# Patient Record
Sex: FEMALE | Race: ASIAN | NOT HISPANIC OR LATINO | URBAN - METROPOLITAN AREA
[De-identification: names, ages, dates, MRNs, and addresses within clinical notes are randomized per-mention and may not be internally consistent; named-entity substitution may affect disease eponyms.]

---
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
## 2024-05-07 ENCOUNTER — EMERGENCY (EMERGENCY)
Facility: HOSPITAL | Age: 19
LOS: 1 days | Discharge: STILL ADMITTED OR ADMINISTRATIVE | End: 2024-05-07
Admitting: EMERGENCY MEDICINE
Payer: MEDICAID

## 2024-05-07 ENCOUNTER — APPOINTMENT (OUTPATIENT)
Dept: ANTEPARTUM | Facility: CLINIC | Age: 19
End: 2024-05-07

## 2024-05-07 PROCEDURE — L9996: CPT

## 2024-05-08 ENCOUNTER — TRANSCRIPTION ENCOUNTER (OUTPATIENT)
Age: 19
End: 2024-05-08

## 2024-05-08 ENCOUNTER — INPATIENT (INPATIENT)
Facility: HOSPITAL | Age: 19
LOS: 1 days | Discharge: HOME | End: 2024-05-10
Attending: SPECIALIST | Admitting: SPECIALIST
Payer: MEDICAID

## 2024-05-08 VITALS
HEART RATE: 88 BPM | RESPIRATION RATE: 16 BRPM | TEMPERATURE: 98 F | SYSTOLIC BLOOD PRESSURE: 136 MMHG | DIASTOLIC BLOOD PRESSURE: 88 MMHG

## 2024-05-08 VITALS
SYSTOLIC BLOOD PRESSURE: 129 MMHG | DIASTOLIC BLOOD PRESSURE: 87 MMHG | TEMPERATURE: 98 F | RESPIRATION RATE: 16 BRPM | HEART RATE: 92 BPM | OXYGEN SATURATION: 96 %

## 2024-05-08 DIAGNOSIS — O26.899 OTHER SPECIFIED PREGNANCY RELATED CONDITIONS, UNSPECIFIED TRIMESTER: ICD-10-CM

## 2024-05-08 DIAGNOSIS — O42.90 PREMATURE RUPTURE OF MEMBRANES, UNSPECIFIED AS TO LENGTH OF TIME BETWEEN RUPTURE AND ONSET OF LABOR, UNSPECIFIED WEEKS OF GESTATION: ICD-10-CM

## 2024-05-08 LAB
BASOPHILS # BLD AUTO: 0.04 K/UL — SIGNIFICANT CHANGE UP (ref 0–0.2)
BASOPHILS NFR BLD AUTO: 0.4 % — SIGNIFICANT CHANGE UP (ref 0–2)
BLD GP AB SCN SERPL QL: NEGATIVE — SIGNIFICANT CHANGE UP
EOSINOPHIL # BLD AUTO: 0.1 K/UL — SIGNIFICANT CHANGE UP (ref 0–0.5)
EOSINOPHIL NFR BLD AUTO: 1.1 % — SIGNIFICANT CHANGE UP (ref 0–6)
GLUCOSE BLDC GLUCOMTR-MCNC: 111 MG/DL — HIGH (ref 70–99)
GLUCOSE BLDC GLUCOMTR-MCNC: 118 MG/DL — HIGH (ref 70–99)
GLUCOSE BLDC GLUCOMTR-MCNC: 120 MG/DL — HIGH (ref 70–99)
GLUCOSE BLDC GLUCOMTR-MCNC: 129 MG/DL — HIGH (ref 70–99)
GLUCOSE BLDC GLUCOMTR-MCNC: 132 MG/DL — HIGH (ref 70–99)
GLUCOSE BLDC GLUCOMTR-MCNC: 133 MG/DL — HIGH (ref 70–99)
GLUCOSE BLDC GLUCOMTR-MCNC: 136 MG/DL — HIGH (ref 70–99)
GLUCOSE BLDC GLUCOMTR-MCNC: 141 MG/DL — HIGH (ref 70–99)
GLUCOSE BLDC GLUCOMTR-MCNC: 142 MG/DL — HIGH (ref 70–99)
GLUCOSE BLDC GLUCOMTR-MCNC: 145 MG/DL — HIGH (ref 70–99)
GLUCOSE BLDC GLUCOMTR-MCNC: 172 MG/DL — HIGH (ref 70–99)
GLUCOSE BLDC GLUCOMTR-MCNC: 175 MG/DL — HIGH (ref 70–99)
GLUCOSE BLDC GLUCOMTR-MCNC: 191 MG/DL — HIGH (ref 70–99)
GLUCOSE BLDC GLUCOMTR-MCNC: 96 MG/DL — SIGNIFICANT CHANGE UP (ref 70–99)
GLUCOSE BLDC GLUCOMTR-MCNC: 96 MG/DL — SIGNIFICANT CHANGE UP (ref 70–99)
HBV SURFACE AG SERPL QL IA: SIGNIFICANT CHANGE UP
HCT VFR BLD CALC: 38.4 % — SIGNIFICANT CHANGE UP (ref 34.5–45)
HCV AB S/CO SERPL IA: 0.11 S/CO — SIGNIFICANT CHANGE UP (ref 0–0.99)
HCV AB SERPL-IMP: SIGNIFICANT CHANGE UP
HGB BLD-MCNC: 13.4 G/DL — SIGNIFICANT CHANGE UP (ref 11.5–15.5)
HIV 1+2 AB+HIV1 P24 AG SERPL QL IA: SIGNIFICANT CHANGE UP
IANC: 6.67 K/UL — SIGNIFICANT CHANGE UP (ref 1.8–7.4)
IMM GRANULOCYTES NFR BLD AUTO: 1.1 % — HIGH (ref 0–0.9)
LYMPHOCYTES # BLD AUTO: 1.76 K/UL — SIGNIFICANT CHANGE UP (ref 1–3.3)
LYMPHOCYTES # BLD AUTO: 19.1 % — SIGNIFICANT CHANGE UP (ref 13–44)
MCHC RBC-ENTMCNC: 29.3 PG — SIGNIFICANT CHANGE UP (ref 27–34)
MCHC RBC-ENTMCNC: 34.9 GM/DL — SIGNIFICANT CHANGE UP (ref 32–36)
MCV RBC AUTO: 84 FL — SIGNIFICANT CHANGE UP (ref 80–100)
MONOCYTES # BLD AUTO: 0.53 K/UL — SIGNIFICANT CHANGE UP (ref 0–0.9)
MONOCYTES NFR BLD AUTO: 5.8 % — SIGNIFICANT CHANGE UP (ref 2–14)
NEUTROPHILS # BLD AUTO: 6.67 K/UL — SIGNIFICANT CHANGE UP (ref 1.8–7.4)
NEUTROPHILS NFR BLD AUTO: 72.5 % — SIGNIFICANT CHANGE UP (ref 43–77)
NRBC # BLD: 0 /100 WBCS — SIGNIFICANT CHANGE UP (ref 0–0)
NRBC # FLD: 0 K/UL — SIGNIFICANT CHANGE UP (ref 0–0)
PLATELET # BLD AUTO: 189 K/UL — SIGNIFICANT CHANGE UP (ref 150–400)
RBC # BLD: 4.57 M/UL — SIGNIFICANT CHANGE UP (ref 3.8–5.2)
RBC # FLD: 13.4 % — SIGNIFICANT CHANGE UP (ref 10.3–14.5)
RH IG SCN BLD-IMP: POSITIVE — SIGNIFICANT CHANGE UP
RH IG SCN BLD-IMP: POSITIVE — SIGNIFICANT CHANGE UP
RUBV IGG SER-ACNC: 7.8 INDEX — SIGNIFICANT CHANGE UP
RUBV IGG SER-IMP: POSITIVE — SIGNIFICANT CHANGE UP
T PALLIDUM AB TITR SER: NEGATIVE — SIGNIFICANT CHANGE UP
WBC # BLD: 9.2 K/UL — SIGNIFICANT CHANGE UP (ref 3.8–10.5)
WBC # FLD AUTO: 9.2 K/UL — SIGNIFICANT CHANGE UP (ref 3.8–10.5)

## 2024-05-08 PROCEDURE — 12345F: CUSTOM | Mod: NC

## 2024-05-08 RX ORDER — OXYCODONE HYDROCHLORIDE 5 MG/1
5 TABLET ORAL
Refills: 0 | Status: DISCONTINUED | OUTPATIENT
Start: 2024-05-08 | End: 2024-05-10

## 2024-05-08 RX ORDER — INSULIN HUMAN 100 [IU]/ML
2 INJECTION, SOLUTION SUBCUTANEOUS
Qty: 100 | Refills: 0 | Status: DISCONTINUED | OUTPATIENT
Start: 2024-05-08 | End: 2024-05-08

## 2024-05-08 RX ORDER — SODIUM CHLORIDE 9 MG/ML
1000 INJECTION, SOLUTION INTRAVENOUS
Refills: 0 | Status: DISCONTINUED | OUTPATIENT
Start: 2024-05-08 | End: 2024-05-08

## 2024-05-08 RX ORDER — OXYTOCIN 10 UNIT/ML
41.67 VIAL (ML) INJECTION
Qty: 20 | Refills: 0 | Status: DISCONTINUED | OUTPATIENT
Start: 2024-05-08 | End: 2024-05-08

## 2024-05-08 RX ORDER — PRAMOXINE HYDROCHLORIDE 150 MG/15G
1 AEROSOL, FOAM RECTAL EVERY 4 HOURS
Refills: 0 | Status: DISCONTINUED | OUTPATIENT
Start: 2024-05-08 | End: 2024-05-10

## 2024-05-08 RX ORDER — DEXTROSE 50 % IN WATER 50 %
50 SYRINGE (ML) INTRAVENOUS
Refills: 0 | Status: DISCONTINUED | OUTPATIENT
Start: 2024-05-08 | End: 2024-05-08

## 2024-05-08 RX ORDER — HYDROCORTISONE 1 %
1 OINTMENT (GRAM) TOPICAL EVERY 6 HOURS
Refills: 0 | Status: DISCONTINUED | OUTPATIENT
Start: 2024-05-08 | End: 2024-05-10

## 2024-05-08 RX ORDER — INSULIN LISPRO 100/ML
10 VIAL (ML) SUBCUTANEOUS
Refills: 0 | DISCHARGE

## 2024-05-08 RX ORDER — TETANUS TOXOID, REDUCED DIPHTHERIA TOXOID AND ACELLULAR PERTUSSIS VACCINE, ADSORBED 5; 2.5; 8; 8; 2.5 [IU]/.5ML; [IU]/.5ML; UG/.5ML; UG/.5ML; UG/.5ML
0.5 SUSPENSION INTRAMUSCULAR ONCE
Refills: 0 | Status: DISCONTINUED | OUTPATIENT
Start: 2024-05-08 | End: 2024-05-10

## 2024-05-08 RX ORDER — DEXTROSE 50 % IN WATER 50 %
25 SYRINGE (ML) INTRAVENOUS
Refills: 0 | Status: DISCONTINUED | OUTPATIENT
Start: 2024-05-08 | End: 2024-05-08

## 2024-05-08 RX ORDER — DIBUCAINE 1 %
1 OINTMENT (GRAM) RECTAL EVERY 6 HOURS
Refills: 0 | Status: DISCONTINUED | OUTPATIENT
Start: 2024-05-08 | End: 2024-05-10

## 2024-05-08 RX ORDER — BENZOCAINE 10 %
1 GEL (GRAM) MUCOUS MEMBRANE EVERY 6 HOURS
Refills: 0 | Status: DISCONTINUED | OUTPATIENT
Start: 2024-05-08 | End: 2024-05-10

## 2024-05-08 RX ORDER — DIPHENHYDRAMINE HCL 50 MG
25 CAPSULE ORAL EVERY 6 HOURS
Refills: 0 | Status: DISCONTINUED | OUTPATIENT
Start: 2024-05-08 | End: 2024-05-10

## 2024-05-08 RX ORDER — KETOROLAC TROMETHAMINE 30 MG/ML
30 SYRINGE (ML) INJECTION ONCE
Refills: 0 | Status: DISCONTINUED | OUTPATIENT
Start: 2024-05-08 | End: 2024-05-08

## 2024-05-08 RX ORDER — IBUPROFEN 200 MG
600 TABLET ORAL EVERY 6 HOURS
Refills: 0 | Status: COMPLETED | OUTPATIENT
Start: 2024-05-08 | End: 2025-04-06

## 2024-05-08 RX ORDER — SODIUM CHLORIDE 9 MG/ML
3 INJECTION INTRAMUSCULAR; INTRAVENOUS; SUBCUTANEOUS EVERY 8 HOURS
Refills: 0 | Status: DISCONTINUED | OUTPATIENT
Start: 2024-05-08 | End: 2024-05-10

## 2024-05-08 RX ORDER — OXYTOCIN 10 UNIT/ML
VIAL (ML) INJECTION
Qty: 30 | Refills: 0 | Status: DISCONTINUED | OUTPATIENT
Start: 2024-05-08 | End: 2024-05-08

## 2024-05-08 RX ORDER — ASPIRIN/CALCIUM CARB/MAGNESIUM 324 MG
1 TABLET ORAL
Refills: 0 | DISCHARGE

## 2024-05-08 RX ORDER — CHOLECALCIFEROL (VITAMIN D3) 125 MCG
1 CAPSULE ORAL
Refills: 0 | DISCHARGE

## 2024-05-08 RX ORDER — AER TRAVELER 0.5 G/1
1 SOLUTION RECTAL; TOPICAL EVERY 4 HOURS
Refills: 0 | Status: DISCONTINUED | OUTPATIENT
Start: 2024-05-08 | End: 2024-05-10

## 2024-05-08 RX ORDER — SODIUM CHLORIDE 9 MG/ML
1000 INJECTION, SOLUTION INTRAVENOUS ONCE
Refills: 0 | Status: DISCONTINUED | OUTPATIENT
Start: 2024-05-08 | End: 2024-05-08

## 2024-05-08 RX ORDER — SIMETHICONE 80 MG/1
80 TABLET, CHEWABLE ORAL EVERY 4 HOURS
Refills: 0 | Status: DISCONTINUED | OUTPATIENT
Start: 2024-05-08 | End: 2024-05-10

## 2024-05-08 RX ORDER — MAGNESIUM HYDROXIDE 400 MG/1
30 TABLET, CHEWABLE ORAL
Refills: 0 | Status: DISCONTINUED | OUTPATIENT
Start: 2024-05-08 | End: 2024-05-10

## 2024-05-08 RX ORDER — OXYTOCIN 10 UNIT/ML
333.33 VIAL (ML) INJECTION
Qty: 20 | Refills: 0 | Status: COMPLETED | OUTPATIENT
Start: 2024-05-08 | End: 2024-05-08

## 2024-05-08 RX ORDER — LANOLIN
1 OINTMENT (GRAM) TOPICAL EVERY 6 HOURS
Refills: 0 | Status: DISCONTINUED | OUTPATIENT
Start: 2024-05-08 | End: 2024-05-10

## 2024-05-08 RX ORDER — MORPHINE SULFATE 50 MG/1
4 CAPSULE, EXTENDED RELEASE ORAL ONCE
Refills: 0 | Status: DISCONTINUED | OUTPATIENT
Start: 2024-05-08 | End: 2024-05-08

## 2024-05-08 RX ORDER — OXYCODONE HYDROCHLORIDE 5 MG/1
5 TABLET ORAL ONCE
Refills: 0 | Status: DISCONTINUED | OUTPATIENT
Start: 2024-05-08 | End: 2024-05-10

## 2024-05-08 RX ORDER — HUMAN INSULIN 100 [IU]/ML
28 INJECTION, SUSPENSION SUBCUTANEOUS
Refills: 0 | DISCHARGE

## 2024-05-08 RX ORDER — INSULIN LISPRO 100/ML
8 VIAL (ML) SUBCUTANEOUS
Refills: 0 | DISCHARGE

## 2024-05-08 RX ORDER — INSULIN HUMAN 100 [IU]/ML
2 INJECTION, SOLUTION SUBCUTANEOUS ONCE
Refills: 0 | Status: COMPLETED | OUTPATIENT
Start: 2024-05-08 | End: 2024-05-08

## 2024-05-08 RX ORDER — ACETAMINOPHEN 500 MG
975 TABLET ORAL
Refills: 0 | Status: DISCONTINUED | OUTPATIENT
Start: 2024-05-08 | End: 2024-05-10

## 2024-05-08 RX ORDER — CHLORHEXIDINE GLUCONATE 213 G/1000ML
1 SOLUTION TOPICAL DAILY
Refills: 0 | Status: DISCONTINUED | OUTPATIENT
Start: 2024-05-08 | End: 2024-05-08

## 2024-05-08 RX ORDER — DEXTROSE 50 % IN WATER 50 %
15 SYRINGE (ML) INTRAVENOUS ONCE
Refills: 0 | Status: DISCONTINUED | OUTPATIENT
Start: 2024-05-08 | End: 2024-05-08

## 2024-05-08 RX ADMIN — INSULIN HUMAN 2 UNIT(S)/HR: 100 INJECTION, SOLUTION SUBCUTANEOUS at 08:26

## 2024-05-08 RX ADMIN — MORPHINE SULFATE 4 MILLIGRAM(S): 50 CAPSULE, EXTENDED RELEASE ORAL at 08:50

## 2024-05-08 RX ADMIN — Medication 30 MILLIGRAM(S): at 20:56

## 2024-05-08 RX ADMIN — INSULIN HUMAN 2 UNIT(S)/HR: 100 INJECTION, SOLUTION SUBCUTANEOUS at 03:37

## 2024-05-08 RX ADMIN — Medication 30 MILLIGRAM(S): at 20:30

## 2024-05-08 RX ADMIN — SODIUM CHLORIDE 3 MILLILITER(S): 9 INJECTION INTRAMUSCULAR; INTRAVENOUS; SUBCUTANEOUS at 22:48

## 2024-05-08 RX ADMIN — SODIUM CHLORIDE 100 MILLILITER(S): 9 INJECTION, SOLUTION INTRAVENOUS at 08:25

## 2024-05-08 RX ADMIN — INSULIN HUMAN 2 UNIT(S): 100 INJECTION, SOLUTION SUBCUTANEOUS at 03:36

## 2024-05-08 RX ADMIN — MORPHINE SULFATE 4 MILLIGRAM(S): 50 CAPSULE, EXTENDED RELEASE ORAL at 08:22

## 2024-05-08 RX ADMIN — CHLORHEXIDINE GLUCONATE 1 APPLICATION(S): 213 SOLUTION TOPICAL at 03:43

## 2024-05-08 RX ADMIN — SODIUM CHLORIDE 125 MILLILITER(S): 9 INJECTION, SOLUTION INTRAVENOUS at 03:40

## 2024-05-08 RX ADMIN — Medication 125 MILLIUNIT(S)/MIN: at 20:31

## 2024-05-08 RX ADMIN — Medication 1000 MILLIUNIT(S)/MIN: at 18:56

## 2024-05-08 RX ADMIN — SODIUM CHLORIDE 25 MILLILITER(S): 9 INJECTION, SOLUTION INTRAVENOUS at 08:26

## 2024-05-08 NOTE — OB RN DELIVERY SUMMARY - NS_GENERALBABYACOMMENTA_OBGYN_ALL_OB_FT
As per mother request As per mother request,  also not done due to PEDS MD Shetty @ bedside for  eval for mild grunting, intermittent CPAP performed by MD for 40 min, 1 hr  glucose was critically low, received feeding in warmer, etc. Post feed & repeat glucose level normal @ 1820 (47) pt denied skin to skin and desired FOB to hold baby @ this time

## 2024-05-08 NOTE — OB PROVIDER H&P - NSLOWPPHRISK_OBGYN_A_OB
No previous uterine incision/[place] Pregnancy/Less than or equal to 4 previous vaginal births/No known bleeding disorder/No history of postpartum hemorrhage

## 2024-05-08 NOTE — ED ADULT TRIAGE NOTE - CHIEF COMPLAINT QUOTE
pt. is  JEREMIAS 24 36 weeks gestation. Pt. states her water broke at 2100, not experiencing contractions at this time. Pt. to be seen at L&D.

## 2024-05-08 NOTE — DISCHARGE NOTE OB - MEDICATION SUMMARY - MEDICATIONS TO TAKE
I will START or STAY ON the medications listed below when I get home from the hospital:  None I will START or STAY ON the medications listed below when I get home from the hospital:    ibuprofen 600 mg oral tablet  -- 1 tab(s) by mouth every 6 hours as needed for  moderate pain  -- Indication: For moderate pain    Prenatal Multivitamins with Folic Acid 1 mg oral tablet  -- 1 tab(s) by mouth once a day  -- Indication: For Vitamins

## 2024-05-08 NOTE — DISCHARGE NOTE OB - CARE PLAN
Principal Discharge DX:	Vaginal delivery  Assessment and plan of treatment:	sylvie   1 Principal Discharge DX:	Vaginal delivery  Assessment and plan of treatment:	After discharge, please stay on pelvic rest for 6 weeks, meaning no sexual intercourse, no tampons and no douching.  No driving for 2 weeks.  No lifting objects heavier than baby for 2 weeks.  Expect to have vaginal bleeding/spotting for up to six weeks.  The bleeding should get lighter and more white/light brown with time.  For bleeding soaking more than a pad an hour or passing clots greater than the size of your fist, come in to the   emergency department.  Follow up in the office in 6 weeks

## 2024-05-08 NOTE — OB RN PATIENT PROFILE - FALL HARM RISK - UNIVERSAL INTERVENTIONS
Bed in lowest position, wheels locked, appropriate side rails in place/Call bell, personal items and telephone in reach/Instruct patient to call for assistance before getting out of bed or chair/Non-slip footwear when patient is out of bed/[place] to call system/Physically safe environment - no spills, clutter or unnecessary equipment/Purposeful Proactive Rounding/Room/bathroom lighting operational, light cord in reach

## 2024-05-08 NOTE — DISCHARGE NOTE OB - PATIENT PORTAL LINK FT
You can access the FollowMyHealth Patient Portal offered by [facility] by registering at the following website: [email or web address]. By joining [employer]’s FollowMyHealth portal, you will also be able to view your health information using other applications (apps) compatible with our system.

## 2024-05-08 NOTE — OB RN DELIVERY SUMMARY - NS_SEPSISRSKCALC_OBGYN_ALL_OB_FT
EOS calculated successfully. EOS Risk Factor: 0.5/1000 live births ([facility] national incidence); GA=37w5d; Temp=99; ROM=23.8; GBS='Unknown'; Antibiotics='No antibiotics or any antibiotics < 2 hrs prior to birth'

## 2024-05-08 NOTE — OB NEONATOLOGY/PEDIATRICIAN DELIVERY SUMMARY - NSPEDSNEONOTESA_OBGYN_ALL_OB_FT
Peds called to LDR after birth for grunting, baby requiring CPAP. 37.5 wk LGA male born via  to a 19y/o  mother. PROM - IOL for gdma2. Maternal history of GDMA2 on Insulin. Maternal labs include Blood Type A+, HIV - , RPR NR , Rubella I , Hep B - , GBS - on . SROM at 1700 on  with clear fluids (ROM hours: 24H).  Baby emerged vigorous, crying, was w/d/s/s with APGARS of 8/9/9. Peds called after birth, started CPAP around 10MOL for grunting and O2 sats low 90s -continued intermittently for 40mins. Baby intermittently jittery, DS <25, temp nl, gave gel and formula and will re-assess DS in 30 min. At 1HOL baby on RA without grunting and O2 sat mid 90s, stable to do skin to skin. Mom plans to initiate breastfeeding , consents Hep B vaccine and consents circ.  Highest maternal temp: 37.2. EOS 0.36.    BW: 3670g    Physical Exam:  Gen: no acute distress, +grimace  HEENT:  anterior fontanel open soft and flat, nondysmorphic facies, no cleft lip/palate, ears normal set, no ear pits or tags, nares clinically patent  Resp: Normal respiratory effort without grunting or retractions, good air entry b/l, clear to auscultation bilaterally  Cardio: Present S1/S2, regular rate and rhythm, no murmurs  Abd: soft, non tender, non distended, umbilical cord with 3 vessels  Neuro: +palmar and plantar grasp, +suck, +tam, normal tone  Extremities: negative goodman and ortolani maneuvers, moving all extremities, no clavicular crepitus or stepoff  Skin: pink, warm  Genitals: Normal male anatomy, testicles palpable in scrotum b/l, Maximus 1, anus patent Peds called to LDR after birth for grunting, baby requiring CPAP. 37.5 wk LGA male born via  to a 19y/o  mother. PROM - IOL for gdma2. Maternal history of GDMA2 on Insulin. Maternal labs include Blood Type A+, HIV - , RPR NR , Rubella I , Hep B - , GBS - on . SROM at 1700 on  with clear fluids (ROM hours: 24H).  Baby emerged vigorous, crying, was w/d/s/s with APGARS of 8/9/9. Peds called after birth, started CPAP around 10MOL for grunting and O2 sats upper 80s/low 90s -continued intermittently for 40mins. Max settings 5L/100% FiO2. Baby intermittently jittery, DS <25, temp nl, gave gel and formula and will re-assess DS in 30 min. At 1HOL baby on RA without grunting and O2 sat mid 90s, stable to do skin to skin. Mom plans to initiate breastfeeding , consents Hep B vaccine and consents circ.  Highest maternal temp: 37.2. EOS 0.36.    BW: 3670g    Physical Exam:  Gen: no acute distress, +grimace  HEENT:  anterior fontanel open soft and flat, nondysmorphic facies, no cleft lip/palate, ears normal set, no ear pits or tags, nares clinically patent  Resp: Normal respiratory effort without grunting or retractions, good air entry b/l, clear to auscultation bilaterally  Cardio: Present S1/S2, regular rate and rhythm, no murmurs  Abd: soft, non tender, non distended, umbilical cord with 3 vessels  Neuro: +palmar and plantar grasp, +suck, +tam, normal tone  Extremities: negative goodman and ortolani maneuvers, moving all extremities, no clavicular crepitus or stepoff  Skin: pink, warm  Genitals: Normal male anatomy, testicles palpable in scrotum b/l, Maximus 1, anus patent

## 2024-05-08 NOTE — OB RN TRIAGE NOTE - NSICDXPASTSURGICALHX_GEN_ALL_CORE_FT
She has new findings of complicated cysts bilaterally.  These will be followed in the short term.    We discussed our fibrocystic mastopathy protocol in detail. She knows that if she follows this protocol - that her symptoms should improve.  We discussed how breast pain is usually not associated with breast cancer, however, pain can be the presenting symptom with some cancers (but this could be coincidental). Still, if her pain does not improve in 8-12 weeks she should call us back for additional recommendations.    
She understands that her exams have remained stable and is comfortable being followed in a conservative fashion. She understands the importance of monthly self-breast examination and knows to report any and all changes as they occur.    She has new right breast nodularity that is consistent with FCCs at ultrasound - see below.    
We discussed her family history and how it could impact her own future risks.  We discussed family vs. genetic history and the importance and implications of each.  Genetic Counseling/Testing was offered, and all questions answered to her satisfaction.  She knows that as additional family members are diagnosed - she will need to let us know as this may change follow up and imaging recommendations.    
We discussed the amount of dense tissue that is seen on her mammogram.  There is data to suggest that dense breast tissue increases breast cancer risk - exactly how much is difficult to accurately calculate.  She realizes that this dense tissue can make it difficult or impossible to view all tissue in the breast to rule out all issues.  It can also make it more difficult to see cancerous tissue.  Due to this, she should be diligent in her monthly exam and report changes as they occur.  Supplemental imaging may also be recommended such as a screening bilateral ultrasound or MRI of the Breast.  She knows that our goal is to assist her in finding breast cancer at an earlier stage (should she develop one).  While nothing is guaranteed - complimenting her annual mammogram with supplemental imaging could help.    
PAST SURGICAL HISTORY:  No significant past surgical history

## 2024-05-08 NOTE — OB RN DELIVERY SUMMARY - NSSELHIDDEN_OBGYN_ALL_OB_FT
[NS_[email or web address]_ALL_OB_FT:[id number]],[NS_DeliveryAssist1_OBGYN_ALL_OB_FT:[id number]],[NS_DeliveryRN_OBGYN_ALL_OB_FT:[id number]]

## 2024-05-08 NOTE — DISCHARGE NOTE OB - MEDICATION SUMMARY - MEDICATIONS TO STOP TAKING
I will STOP taking the medications listed below when I get home from the hospital:  None I will STOP taking the medications listed below when I get home from the hospital:    HumaLOG 100 units/mL subcutaneous solution  -- 10 subcutaneous    HumaLOG 100 units/mL subcutaneous solution  -- 8 unit(s) subcutaneous 2 times a day (before meals)    HumuLIN N 100 units/mL subcutaneous suspension  -- 28 unit(s) subcutaneous once a day (at bedtime)    HumaLOG KwikPen 100 units/mL injectable solution  -- 10 unit(s) injectable once a day (after a meal)

## 2024-05-08 NOTE — OB PROVIDER DELIVERY SUMMARY - NSPROVIDERDELIVERYNOTE_OBGYN_ALL_OB_FT
Pt became fully dilated and desired to push.   Spontaneous vaginal delivery of liveborn infant.  Head, shoulders and body delivered easily.  Infant was passed to mother.  Cord clamping was delayed 1 minute. Cord was cut.  Placenta delivered spontaneously intact. Uterine massage was performed and pitocin was given.  Fundus was firm. Second degree laceration repaired with chromic.  Good hemostasis was noted.  No other perineal, cervical, or vaginal lacerations noted.  Count correct x2. Mother and baby resting comfortably.  QBL 134mL  APGARS 8/9  Weight 3670g    Present for delivery were Misti PGY-1 and Dr. Jalen Chappell PGY-1

## 2024-05-08 NOTE — OB RN DELIVERY SUMMARY - AS DELIV COMPLICATIONS OB
Patient is requesting an update on the below message.    Please call the patient back to discuss further.        prolonged rupture of membranes/premature rupture of membranes prior to labor

## 2024-05-08 NOTE — OB PROVIDER TRIAGE NOTE - NSHPPHYSICALEXAM_GEN_ALL_CORE
Vital Signs Last 24 Hrs  T(C): 36.4 (08 May 2024 00:49), Max: 36.7 (08 May 2024 00:00)  T(F): 97.52 (08 May 2024 00:49), Max: 98 (08 May 2024 00:00)  HR: 82 (08 May 2024 00:49) (82 - 92)  BP: 130/79 (08 May 2024 00:49) (129/87 - 136/88)  BP(mean): --  RR: 16 (08 May 2024 00:49) (16 - 16)  SpO2: 96% (08 May 2024 00:00) (96% - 96%)    Gen: A/O x3  Abd: Gravid uterus, toco in place   SSE: os appears closed, pooling of clear fluid in vault, nitrazine positive, ferning positive  TAS: vertex, posterior placenta, MVP 5, [id number] bpm in m-mode, images saved in ASOB  FHR: in progress, reactive, indeterminate baseline  CTX: regular, q4-8 minutes  SVE: 0/0/-3  EFW: 3600

## 2024-05-08 NOTE — DISCHARGE NOTE OB - MATERIALS PROVIDED
Vaccinations/[facility]  Screening Program/  Immunization Record/Breastfeeding Log/Bottle Feeding Log/Breastfeeding Mother’s Support Group Information/Guide to Postpartum Care/[facility] Hearing Screen Program/Shaken Baby Prevention Handout/Birth Certificate Instructions

## 2024-05-08 NOTE — DISCHARGE NOTE OB - CARE PROVIDER_API CALL
Rashawn Mccarthy  Obstetrics and Gynecology  [address] [place] [address], [address]  [place] [address]  Phone: [phone]  Fax: [phone]  Follow Up Time:

## 2024-05-08 NOTE — OB PROVIDER H&P - PROBLEM SELECTOR PLAN 1
20 y/o , EDC , GA 37.5 weeks, evidence of PROM.  -Admit to Labor and Delivery  -GBS unknown  -PO cytotec  -Risks, benefits, alternatives, and possible complications have been discussed in detail with the patient in her native language. Pre-admission, admission, and post admission procedures and expectations were discussed in detail. All questions answered, all appropriate hospital consents were signed. Anticipate normal vaginal delivery.   Informed consent was obtained. The following was discussed:   - Induction/augmentation of labor: use of medication and/or cook balloon to begin or enhance labor   - Obstetrical management including internal fetal/contraction monitoring   - Normal vaginal delivery   - Possible  section     -discussed with Dr. Henrry Carvajal, PAC 20 y/o , EDC , GA 37.5 weeks, evidence of PROM.  -Admit to Labor and Delivery  -GBS unknown  -PO cytotec  -Risks, benefits, alternatives, and possible complications have been discussed in detail with the patient in her native language. Pre-admission, admission, and post admission procedures and expectations were discussed in detail. All questions answered, all appropriate hospital consents were signed. Anticipate normal vaginal delivery.   Informed consent was obtained. The following was discussed:   - Induction/augmentation of labor: use of medication and/or cook balloon to begin or enhance labor   - Obstetrical management including internal fetal/contraction monitoring   - Normal vaginal delivery   - Possible  section     -discussed with Dr. Henrry Carvajal, PAC    2:15- Finger stick 172, initiate insulin drip guidelines

## 2024-05-08 NOTE — OB PROVIDER TRIAGE NOTE - NSOBPROVIDERNOTE_OBGYN_ALL_OB_FT
20 y/o , EDC , GA 37.5 weeks, evidence of PROM.  -Admit to Labor and Delivery  -GBS unknown  -PO cytotec  -Risks, benefits, alternatives, and possible complications have been discussed in detail with the patient in her native language. Pre-admission, admission, and post admission procedures and expectations were discussed in detail. All questions answered, all appropriate hospital consents were signed. Anticipate normal vaginal delivery.   Informed consent was obtained. The following was discussed:   - Induction/augmentation of labor: use of medication and/or cook balloon to begin or enhance labor   - Obstetrical management including internal fetal/contraction monitoring   - Normal vaginal delivery   - Possible  section     -discussed with Dr. Henrry Carvajal, PAC

## 2024-05-08 NOTE — OB PROVIDER DELIVERY SUMMARY - NSSELHIDDEN_OBGYN_ALL_OB_FT
[NS_DeliveryAttending1_OBGYN_ALL_OB_FT:[id number]],[NS_DeliveryAssist1_OBGYN_ALL_OB_FT:[id number]]

## 2024-05-08 NOTE — OB RN DELIVERY SUMMARY - NS_REASONNOSKINA_OBGYN_ALL_OB
Maternal declined, after counseling and support Maternal declined, after counseling and support/[facility]'s Clinical Indication

## 2024-05-08 NOTE — OB PROVIDER H&P - NSHPPHYSICALEXAM_GEN_ALL_CORE
Vital Signs Last 24 Hrs  T(C): 36.4 (08 May 2024 00:49), Max: 36.7 (08 May 2024 00:00)  T(F): 97.52 (08 May 2024 00:49), Max: 98 (08 May 2024 00:00)  HR: 82 (08 May 2024 00:49) (82 - 92)  BP: 130/79 (08 May 2024 00:49) (129/87 - 136/88)  BP(mean): --  RR: 16 (08 May 2024 00:49) (16 - 16)  SpO2: 96% (08 May 2024 00:00) (96% - 96%)    Gen: A/O x3  Abd: Gravid uterus, toco in place   SSE: os appears closed, pooling of clear fluid in vault, nitrazine positive, ferning positive  TAS: vertex, posterior placenta, MVP 5, [id number] bpm in m-mode, images saved in ASOB  FHR: in progress, reactive, indeterminate baseline  CTX: regular, q4-8 minutes  SVE: 0/0/-3  EFW: 3600

## 2024-05-08 NOTE — OB PROVIDER TRIAGE NOTE - HISTORY OF PRESENT ILLNESS
20 y/o , EDC , [place] 37.5 weeks, presents for leaking of clear fluid since 17:00 today. Denies VB/ contractions. Reports good FM.    PNC: Dr. Mccarthy    AP Course: GDMA2, on humulin NPH 28 units qhs, humulog 8u before breakfast and 10u after breakfast  GBS: Unknown

## 2024-05-08 NOTE — OB PROVIDER H&P - ASSESSMENT
18 y/o , EDC , [place] 37.5 weeks, evidence of PROM.  -Admit to Labor and Delivery  -GBS unknown  -PO cytotec  -Risks, benefits, alternatives, and possible complications have been discussed in detail with the patient in her native language. Pre-admission, admission, and post admission procedures and expectations were discussed in detail. All questions answered, all appropriate hospital consents were signed. Anticipate normal vaginal delivery.   Informed consent was obtained. The following was discussed:   - Induction/augmentation of labor: use of medication and/or cook balloon to begin or enhance labor   - Obstetrical management including internal fetal/contraction monitoring   - Normal vaginal delivery   - Possible  section     -discussed with Dr. Henrry Carvajal, PAC 18 y/o , EDC , [place] 37.5 weeks, evidence of PROM.  -Admit to Labor and Delivery  -GBS unknown  -PO cytotec  -Risks, benefits, alternatives, and possible complications have been discussed in detail with the patient in her native language. Pre-admission, admission, and post admission procedures and expectations were discussed in detail. All questions answered, all appropriate hospital consents were signed. Anticipate normal vaginal delivery.   Informed consent was obtained. The following was discussed:   - Induction/augmentation of labor: use of medication and/or cook balloon to begin or enhance labor   - Obstetrical management including internal fetal/contraction monitoring   - Normal vaginal delivery   - Possible  section     -discussed with Dr. Henrry Carvajal, PAC    2:15- Finger stick 172, initiate insulin drip guidelines

## 2024-05-08 NOTE — OB PROVIDER LABOR PROGRESS NOTE - ASSESSMENT
20 yo  at 37/5 weeks PROM IOL and GDMA2  - cervical change noted. pt counseled on epidural   - cont with PO cytotec   - cont to monitor EFM/toco    d/w Dr Jalen batres PA-C

## 2024-05-08 NOTE — DISCHARGE NOTE OB - PLAN OF CARE
After discharge, please stay on pelvic rest for 6 weeks, meaning no sexual intercourse, no tampons and no douching.  No driving for 2 weeks.  No lifting objects heavier than baby for 2 weeks.  Expect to have vaginal bleeding/spotting for up to six weeks.  The bleeding should get lighter and more white/light brown with time.  For bleeding soaking more than a pad an hour or passing clots greater than the size of your fist, come in to the   emergency department.  Follow up in the office in 6 weeks

## 2024-05-09 RX ORDER — IBUPROFEN 200 MG
600 TABLET ORAL EVERY 6 HOURS
Refills: 0 | Status: DISCONTINUED | OUTPATIENT
Start: 2024-05-09 | End: 2024-05-10

## 2024-05-09 RX ADMIN — Medication 600 MILLIGRAM(S): at 11:39

## 2024-05-09 RX ADMIN — Medication 600 MILLIGRAM(S): at 17:34

## 2024-05-09 RX ADMIN — SODIUM CHLORIDE 3 MILLILITER(S): 9 INJECTION INTRAMUSCULAR; INTRAVENOUS; SUBCUTANEOUS at 21:20

## 2024-05-09 RX ADMIN — Medication 1 TABLET(S): at 11:40

## 2024-05-09 RX ADMIN — Medication 975 MILLIGRAM(S): at 08:33

## 2024-05-09 RX ADMIN — SODIUM CHLORIDE 3 MILLILITER(S): 9 INJECTION INTRAMUSCULAR; INTRAVENOUS; SUBCUTANEOUS at 05:55

## 2024-05-09 RX ADMIN — SODIUM CHLORIDE 3 MILLILITER(S): 9 INJECTION INTRAMUSCULAR; INTRAVENOUS; SUBCUTANEOUS at 15:40

## 2024-05-09 RX ADMIN — Medication 600 MILLIGRAM(S): at 12:26

## 2024-05-09 RX ADMIN — Medication 975 MILLIGRAM(S): at 07:49

## 2024-05-09 RX ADMIN — Medication 600 MILLIGRAM(S): at 18:09

## 2024-05-09 RX ADMIN — Medication 975 MILLIGRAM(S): at 15:11

## 2024-05-09 RX ADMIN — Medication 975 MILLIGRAM(S): at 16:04

## 2024-05-09 NOTE — PROGRESS NOTE ADULT - SUBJECTIVE AND OBJECTIVE BOX
S: Patient doing well. Minimal lochia. Pain controlled.    O: Vital Signs Last 24 Hrs  T(C): 37.2 (09 May 2024 09:41), Max: 37.4 (08 May 2024 17:15)  T(F): 98.9 (09 May 2024 09:41), Max: 99.3 (08 May 2024 17:15)  HR: 97 (09 May 2024 09:41) (64 - 117)  BP: 118/79 (09 May 2024 09:41) (86/49 - 153/96)  BP(mean): --  RR: 19 (09 May 2024 09:41) (16 - 19)  SpO2: 98% (09 May 2024 09:41) (92% - 100%)    Parameters below as of 09 May 2024 09:41  Patient On (Oxygen Delivery Method): room air        Gen: NAD  Abd: soft, NT, ND, fundus firm below umbilicus  Lochia: moderate  Ext: no tenderness    Labs:                        13.4   9.20  )-----------( 189      ( 08 May 2024 02:15 )             38.4       A: 19y PPD#1 s/p  doing well.  Plan: routine care  DC with instructions for 5/10

## 2024-05-10 VITALS
TEMPERATURE: 99 F | HEART RATE: 82 BPM | SYSTOLIC BLOOD PRESSURE: 119 MMHG | DIASTOLIC BLOOD PRESSURE: 80 MMHG | RESPIRATION RATE: 19 BRPM | OXYGEN SATURATION: 99 %

## 2024-05-10 RX ORDER — IBUPROFEN 200 MG
1 TABLET ORAL
Qty: 0 | Refills: 0 | DISCHARGE
Start: 2024-05-10

## 2024-05-10 RX ADMIN — Medication 600 MILLIGRAM(S): at 12:25

## 2024-05-10 RX ADMIN — Medication 600 MILLIGRAM(S): at 06:09

## 2024-05-10 RX ADMIN — Medication 1 TABLET(S): at 12:28

## 2024-05-10 RX ADMIN — Medication 600 MILLIGRAM(S): at 12:55

## 2024-05-10 RX ADMIN — SODIUM CHLORIDE 3 MILLILITER(S): 9 INJECTION INTRAMUSCULAR; INTRAVENOUS; SUBCUTANEOUS at 06:16

## 2024-05-10 RX ADMIN — Medication 600 MILLIGRAM(S): at 06:40

## 2024-06-06 ENCOUNTER — INPATIENT (INPATIENT)
Facility: HOSPITAL | Age: 19
LOS: 5 days | Discharge: HOME | End: 2024-06-12
Attending: SURGERY | Admitting: SURGERY
Payer: MEDICAID

## 2024-06-06 VITALS
OXYGEN SATURATION: 97 % | SYSTOLIC BLOOD PRESSURE: 106 MMHG | HEART RATE: 65 BPM | DIASTOLIC BLOOD PRESSURE: 70 MMHG | RESPIRATION RATE: 18 BRPM | HEIGHT: 63 IN | WEIGHT: 164.91 LBS | TEMPERATURE: 98 F

## 2024-06-06 DIAGNOSIS — R10.11 RIGHT UPPER QUADRANT PAIN: ICD-10-CM

## 2024-06-06 LAB
ADD ON TEST-SPECIMEN IN LAB: SIGNIFICANT CHANGE UP
ALBUMIN SERPL ELPH-MCNC: 4.2 G/DL — SIGNIFICANT CHANGE UP (ref 3.3–5)
ALP SERPL-CCNC: 115 U/L — SIGNIFICANT CHANGE UP (ref 40–120)
ALT FLD-CCNC: 449 U/L — HIGH (ref 4–33)
ANION GAP SERPL CALC-SCNC: 14 MMOL/L — SIGNIFICANT CHANGE UP (ref 7–14)
APTT BLD: 24.1 SEC — LOW (ref 24.5–35.6)
AST SERPL-CCNC: 613 U/L — HIGH (ref 4–32)
BASOPHILS # BLD AUTO: 0.04 K/UL — SIGNIFICANT CHANGE UP (ref 0–0.2)
BASOPHILS NFR BLD AUTO: 0.6 % — SIGNIFICANT CHANGE UP (ref 0–2)
BILIRUB SERPL-MCNC: 2.1 MG/DL — HIGH (ref 0.2–1.2)
BLD GP AB SCN SERPL QL: NEGATIVE — SIGNIFICANT CHANGE UP
BUN SERPL-MCNC: 7 MG/DL — SIGNIFICANT CHANGE UP (ref 7–23)
CALCIUM SERPL-MCNC: 9.3 MG/DL — SIGNIFICANT CHANGE UP (ref 8.4–10.5)
CHLORIDE SERPL-SCNC: 101 MMOL/L — SIGNIFICANT CHANGE UP (ref 98–107)
CO2 SERPL-SCNC: 23 MMOL/L — SIGNIFICANT CHANGE UP (ref 22–31)
CREAT SERPL-MCNC: 0.43 MG/DL — LOW (ref 0.5–1.3)
EGFR: 143 ML/MIN/1.73M2 — SIGNIFICANT CHANGE UP
EOSINOPHIL # BLD AUTO: 0.11 K/UL — SIGNIFICANT CHANGE UP (ref 0–0.5)
EOSINOPHIL NFR BLD AUTO: 1.7 % — SIGNIFICANT CHANGE UP (ref 0–6)
GLUCOSE SERPL-MCNC: 195 MG/DL — HIGH (ref 70–99)
HCG SERPL-ACNC: <1 MIU/ML — SIGNIFICANT CHANGE UP
HCT VFR BLD CALC: 44.1 % — SIGNIFICANT CHANGE UP (ref 34.5–45)
HGB BLD-MCNC: 14.5 G/DL — SIGNIFICANT CHANGE UP (ref 11.5–15.5)
IANC: 4.85 K/UL — SIGNIFICANT CHANGE UP (ref 1.8–7.4)
IMM GRANULOCYTES NFR BLD AUTO: 0.2 % — SIGNIFICANT CHANGE UP (ref 0–0.9)
INR BLD: 0.96 RATIO — SIGNIFICANT CHANGE UP (ref 0.85–1.18)
LIDOCAIN IGE QN: 42 U/L — SIGNIFICANT CHANGE UP (ref 7–60)
LYMPHOCYTES # BLD AUTO: 1.02 K/UL — SIGNIFICANT CHANGE UP (ref 1–3.3)
LYMPHOCYTES # BLD AUTO: 16.1 % — SIGNIFICANT CHANGE UP (ref 13–44)
MCHC RBC-ENTMCNC: 27.4 PG — SIGNIFICANT CHANGE UP (ref 27–34)
MCHC RBC-ENTMCNC: 32.9 GM/DL — SIGNIFICANT CHANGE UP (ref 32–36)
MCV RBC AUTO: 83.2 FL — SIGNIFICANT CHANGE UP (ref 80–100)
MONOCYTES # BLD AUTO: 0.29 K/UL — SIGNIFICANT CHANGE UP (ref 0–0.9)
MONOCYTES NFR BLD AUTO: 4.6 % — SIGNIFICANT CHANGE UP (ref 2–14)
NEUTROPHILS # BLD AUTO: 4.85 K/UL — SIGNIFICANT CHANGE UP (ref 1.8–7.4)
NEUTROPHILS NFR BLD AUTO: 76.8 % — SIGNIFICANT CHANGE UP (ref 43–77)
NRBC # BLD: 0 /100 WBCS — SIGNIFICANT CHANGE UP (ref 0–0)
NRBC # FLD: 0 K/UL — SIGNIFICANT CHANGE UP (ref 0–0)
PLATELET # BLD AUTO: 278 K/UL — SIGNIFICANT CHANGE UP (ref 150–400)
POTASSIUM SERPL-MCNC: 4.2 MMOL/L — SIGNIFICANT CHANGE UP (ref 3.5–5.3)
POTASSIUM SERPL-SCNC: 4.2 MMOL/L — SIGNIFICANT CHANGE UP (ref 3.5–5.3)
PROT SERPL-MCNC: 7.3 G/DL — SIGNIFICANT CHANGE UP (ref 6–8.3)
PROTHROM AB SERPL-ACNC: 10.9 SEC — SIGNIFICANT CHANGE UP (ref 9.5–13)
RBC # BLD: 5.3 M/UL — HIGH (ref 3.8–5.2)
RBC # FLD: 12.2 % — SIGNIFICANT CHANGE UP (ref 10.3–14.5)
RH IG SCN BLD-IMP: POSITIVE — SIGNIFICANT CHANGE UP
SODIUM SERPL-SCNC: 138 MMOL/L — SIGNIFICANT CHANGE UP (ref 135–145)
WBC # BLD: 6.32 K/UL — SIGNIFICANT CHANGE UP (ref 3.8–10.5)
WBC # FLD AUTO: 6.32 K/UL — SIGNIFICANT CHANGE UP (ref 3.8–10.5)

## 2024-06-06 PROCEDURE — 76705 ECHO EXAM OF ABDOMEN: CPT | Mod: 26

## 2024-06-06 PROCEDURE — 99285 EMERGENCY DEPT VISIT HI MDM: CPT

## 2024-06-06 RX ORDER — PIPERACILLIN AND TAZOBACTAM 4; .5 G/20ML; G/20ML
4.5 INJECTION, POWDER, LYOPHILIZED, FOR SOLUTION INTRAVENOUS EVERY 8 HOURS
Refills: 0 | Status: DISCONTINUED | OUTPATIENT
Start: 2024-06-06 | End: 2024-06-08

## 2024-06-06 RX ORDER — ENOXAPARIN SODIUM 100 MG/ML
40 INJECTION SUBCUTANEOUS EVERY 24 HOURS
Refills: 0 | Status: DISCONTINUED | OUTPATIENT
Start: 2024-06-06 | End: 2024-06-07

## 2024-06-06 RX ORDER — FAMOTIDINE 10 MG/ML
20 INJECTION INTRAVENOUS ONCE
Refills: 0 | Status: COMPLETED | OUTPATIENT
Start: 2024-06-06 | End: 2024-06-06

## 2024-06-06 RX ORDER — ACETAMINOPHEN 500 MG
975 TABLET ORAL ONCE
Refills: 0 | Status: COMPLETED | OUTPATIENT
Start: 2024-06-06 | End: 2024-06-06

## 2024-06-06 RX ORDER — SODIUM CHLORIDE 9 MG/ML
1000 INJECTION INTRAMUSCULAR; INTRAVENOUS; SUBCUTANEOUS ONCE
Refills: 0 | Status: COMPLETED | OUTPATIENT
Start: 2024-06-06 | End: 2024-06-06

## 2024-06-06 RX ORDER — ACETAMINOPHEN 500 MG
1000 TABLET ORAL EVERY 6 HOURS
Refills: 0 | Status: COMPLETED | OUTPATIENT
Start: 2024-06-06 | End: 2024-06-07

## 2024-06-06 RX ORDER — SODIUM CHLORIDE 9 MG/ML
1000 INJECTION, SOLUTION INTRAVENOUS
Refills: 0 | Status: DISCONTINUED | OUTPATIENT
Start: 2024-06-06 | End: 2024-06-08

## 2024-06-06 RX ADMIN — Medication 30 MILLILITER(S): at 09:16

## 2024-06-06 RX ADMIN — Medication 400 MILLIGRAM(S): at 23:42

## 2024-06-06 RX ADMIN — Medication 975 MILLIGRAM(S): at 09:17

## 2024-06-06 RX ADMIN — Medication 1 TABLET(S): at 18:55

## 2024-06-06 RX ADMIN — SODIUM CHLORIDE 1000 MILLILITER(S): 9 INJECTION INTRAMUSCULAR; INTRAVENOUS; SUBCUTANEOUS at 09:16

## 2024-06-06 RX ADMIN — FAMOTIDINE 20 MILLIGRAM(S): 10 INJECTION INTRAVENOUS at 09:16

## 2024-06-06 RX ADMIN — ENOXAPARIN SODIUM 40 MILLIGRAM(S): 100 INJECTION SUBCUTANEOUS at 22:08

## 2024-06-06 RX ADMIN — PIPERACILLIN AND TAZOBACTAM 200 GRAM(S): 4; .5 INJECTION, POWDER, LYOPHILIZED, FOR SOLUTION INTRAVENOUS at 22:07

## 2024-06-06 NOTE — ED ADULT NURSE NOTE - OBJECTIVE STATEMENT
Pt received to intake 4 , awake and alert, A&OX4, ambulatory. C/o abdominal pain with episodes of vomiting since last night. pt currently on her MP , pt denies any heavy bleeding. pt abdomen soft , non-tender and non-distended. pt reports last bowel movement was this AM.  Respirations even and unlabored. Denies any current CP, SOB, vomiting , HA, dizziness, palpitations, blurry vision. 20G IV placed to left AC . Bed in lowest position, call bell within reach. Safety maintained.

## 2024-06-06 NOTE — H&P ADULT - ASSESSMENT
19F w/ recent pregnancy p/w concern for choledocholithiasis. Labs notable for transaminitis and bilirubinemia    PLAN:  - MRCP  - Diet: CLD  - Trend LFTs and bilirubin  - If + MRCP or uptrending bilirubin then will consult GI for ERCP, if downtrending then will plan for lap lucina  - pain control  - dvt ppx    Discussed w/ ACS attending, Dr. Tonya KISER-Team, [id number]  19F w/ recent pregnancy p/w concern for choledocholithiasis. Labs notable for transaminitis and bilirubinemia    PLAN:  - MRCP  - Diet: NPO (for MRCP)  - Trend LFTs and bilirubin  - If + MRCP or uptrending bilirubin then will consult GI for ERCP, if downtrending then will plan for lap lucina  - pain control  - dvt ppx    Discussed w/ ACS attending, Dr. Tonya KISER-Team, [id number]

## 2024-06-06 NOTE — H&P ADULT - NSHPPHYSICALEXAM_GEN_ALL_CORE
Vital Signs Last 24 Hrs  T(C): 36.6 (06 Jun 2024 09:27), Max: 36.7 (06 Jun 2024 07:58)  T(F): 97.8 (06 Jun 2024 09:27), Max: 98.1 (06 Jun 2024 07:58)  HR: 65 (06 Jun 2024 07:58) (65 - 65)  BP: 106/70 (06 Jun 2024 07:58) (106/70 - 106/70)  RR: 18 (06 Jun 2024 07:58) (18 - 18)  SpO2: 97% (06 Jun 2024 07:58) (97% - 97%)    Parameters below as of 06 Jun 2024 07:58  Patient On (Oxygen Delivery Method): room air      PHYSICAL EXAM:  GENERAL: NAD, well-groomed, well-developed  HEAD:  Atraumatic, Normocephalic  EYES: EOMI, PERRLA, conjunctiva and sclera clear  NERVOUS SYSTEM: AOX3, motor and sensation grossly intact in b/l UE and b/l LE  CHEST/LUNG: unlabored respirations  HEART: Regular rate and rhythm  ABDOMEN: Soft, nondistended, RUQ and epigastric tenderness  EXTREMITIES:  2+ Peripheral Pulses, No clubbing, cyanosis

## 2024-06-06 NOTE — H&P ADULT - ATTENDING COMMENTS
Pt seen and examined.  Agree with resident eval and plan.  Imp: Cholelithiasis with likely choledocholithiasis.  MRCP and GI eval.  Cholecystectomy prior to discharge.

## 2024-06-06 NOTE — ED PROVIDER NOTE - NSICDXNOPASTSURGICALHX_GEN_ALL_ED
No care due was identified.  [facility] Embedded Care Due Messages. Reference number: [id number].   5/02/2024 10:16:27 AM CDT   <-- Click to add NO significant Past Surgical History

## 2024-06-06 NOTE — ED ADULT TRIAGE NOTE - CHIEF COMPLAINT QUOTE
Pt c/o abd pain since 10pm last night associated with n/v and constipation. Last BM yesterday. Denies bloody emesis/stool, chest pain, SOB, urinary symptoms. Well appearing

## 2024-06-06 NOTE — H&P ADULT - HISTORY OF PRESENT ILLNESS
19 year old female w/ recent vaginal delivery (5/8/24) p/w epigastric and RUQ abdominal pain that started 2 days ago. Patient reports the pain was initially 2 days ago but resolved but returned last night with repeated episodes of nausea/vomiting. Patient reports pain is pressure in her right upper abdomen and radiates to back. Reports inability to tolerate PO and SOB. Denies fever/chills, dysuria. diarrhea, history of similar pain.

## 2024-06-06 NOTE — ED PROVIDER NOTE - PHYSICAL EXAMINATION
Jazz WALLER:  VITALS: Initial triage and subsequent vitals have been reviewed by me.  GEN APPEARANCE: Alert, cooperative. Non-toxic appearing. Well appearing. NAD.  HEAD: Atraumatic, normocephalic   EYES: PERRLa, EOMI, vision grossly intact.   EARS: Gross hearing intact.   NOSE: No nasal discharge, no external evidence of epistaxis.   NECK: Supple  CV: RRR, S1S2, no c/r/m/g. No cyanosis. Extremities warm, well perfused. Cap refill <2 seconds. No bruits.   LUNGS: CTAB. No wheezing. No rales. No rhonchi. No diminished breath sounds.   ABDOMEN: epigastric/RUQ ttp    MSK/EXT: Spine appears normal, no spine point tenderness. No CVA ttp. Normal muscular development. Pelvis stable. No obvious joint or bony deformity, no peripheral edema.   NEURO: Alert, follows commands. Weight bearing normal. Speech normal. Sensation and motor normal x4 extremities.   SKIN: Normal color for race, warm, dry and intact. No evidence of rash.  PSYCH: Normal mood and affect.

## 2024-06-06 NOTE — ED PROVIDER NOTE - OBJECTIVE STATEMENT
Jazz WALLER: 19-year-old female no reported medical problems recently delivered about 1 month ago presents with a chief complaint of epigastric/right upper quadrant pain that started last night associated with repeated episodes of nausea vomiting worse when lying down has not tolerated p.o. since had some diarrhea nonbloody yesterday.  No fever no chest pain or trouble breathing no urinary complaints no bowel complaint otherwise no rashes.  Has never had pain like this before.

## 2024-06-06 NOTE — ED PROVIDER NOTE - CLINICAL SUMMARY MEDICAL DECISION MAKING FREE TEXT BOX
Jazz WALLER: Exam vitals are stable nontoxic-appearing with physical exam as above, DDx concern for possible acute cholecystitis versus pancreatitis less concern for kidney stone or urinary tract infection presentation does not fit consistent with that of ACS, we will check basic labs right upper quadrant sono give meds as needed and reassess consider surgical consult as indicated consider CDU pending studies results will reassess and dispo accordingly pending studies.

## 2024-06-06 NOTE — H&P ADULT - NSHPLABSRESULTS_GEN_ALL_CORE
CBC (06-06 @ 09:20)                              14.5                           6.32    )----------------(  278        76.8  % Neutrophils, 16.1  % Lymphocytes, ANC: 4.85                                44.1                  BMP (06-06 @ 09:20)             138     |  101     |  7     		Ca++ --      Ca 9.3                ---------------------------------( 195<H>		Mg --                 4.2     |  23      |  0.43<L>			Ph --        LFTs (06-06 @ 09:20)      TPro 7.3 / Alb 4.2 / TBili 2.1<H> / DBili 1.1<H> / <H> / <H> / AlkPhos 115    IMAGING:    < from: US Abdomen Upper Quadrant Right (06.06.24 @ 10:10) >    FINDINGS:  Liver: Increased echogenicity.  Bile ducts: Normal caliber. Common bile duct measures 5 mm.  Gallbladder: Numerous layering gallstones. Gallbladder wall thickening   measuring 6 mm. Sonographic Maddox sign may be unreliable in the setting   of pain medication.  Pancreas: Visualized portions are within normal limits.  Right kidney: 10.6 cm. No hydronephrosis.  Ascites: None.  IVC: Visualized portions are within normal limits.    IMPRESSION:  Cholelithiasis and gallbladder wall thickening, concerning for acute   cholecystitis. HIDA scan can be performed as clinically warranted.    Hepatic steatosis.    < end of copied text >

## 2024-06-07 LAB
ALBUMIN SERPL ELPH-MCNC: 3.3 G/DL — SIGNIFICANT CHANGE UP (ref 3.3–5)
ALP SERPL-CCNC: 107 U/L — SIGNIFICANT CHANGE UP (ref 40–120)
ALT FLD-CCNC: 295 U/L — HIGH (ref 4–33)
ANION GAP SERPL CALC-SCNC: 13 MMOL/L — SIGNIFICANT CHANGE UP (ref 7–14)
AST SERPL-CCNC: 174 U/L — HIGH (ref 4–32)
BILIRUB SERPL-MCNC: 1.9 MG/DL — HIGH (ref 0.2–1.2)
BUN SERPL-MCNC: 7 MG/DL — SIGNIFICANT CHANGE UP (ref 7–23)
CALCIUM SERPL-MCNC: 8.5 MG/DL — SIGNIFICANT CHANGE UP (ref 8.4–10.5)
CHLORIDE SERPL-SCNC: 104 MMOL/L — SIGNIFICANT CHANGE UP (ref 98–107)
CO2 SERPL-SCNC: 22 MMOL/L — SIGNIFICANT CHANGE UP (ref 22–31)
CREAT SERPL-MCNC: 0.58 MG/DL — SIGNIFICANT CHANGE UP (ref 0.5–1.3)
EGFR: 133 ML/MIN/1.73M2 — SIGNIFICANT CHANGE UP
GLUCOSE SERPL-MCNC: 119 MG/DL — HIGH (ref 70–99)
HCT VFR BLD CALC: 37.6 % — SIGNIFICANT CHANGE UP (ref 34.5–45)
HGB BLD-MCNC: 12.8 G/DL — SIGNIFICANT CHANGE UP (ref 11.5–15.5)
MAGNESIUM SERPL-MCNC: 1.9 MG/DL — SIGNIFICANT CHANGE UP (ref 1.6–2.6)
MCHC RBC-ENTMCNC: 28.3 PG — SIGNIFICANT CHANGE UP (ref 27–34)
MCHC RBC-ENTMCNC: 34 GM/DL — SIGNIFICANT CHANGE UP (ref 32–36)
MCV RBC AUTO: 83 FL — SIGNIFICANT CHANGE UP (ref 80–100)
NRBC # BLD: 0 /100 WBCS — SIGNIFICANT CHANGE UP (ref 0–0)
NRBC # FLD: 0 K/UL — SIGNIFICANT CHANGE UP (ref 0–0)
PHOSPHATE SERPL-MCNC: 4.2 MG/DL — SIGNIFICANT CHANGE UP (ref 2.5–4.5)
PLATELET # BLD AUTO: 235 K/UL — SIGNIFICANT CHANGE UP (ref 150–400)
POTASSIUM SERPL-MCNC: 3.6 MMOL/L — SIGNIFICANT CHANGE UP (ref 3.5–5.3)
POTASSIUM SERPL-SCNC: 3.6 MMOL/L — SIGNIFICANT CHANGE UP (ref 3.5–5.3)
PROT SERPL-MCNC: 5.7 G/DL — LOW (ref 6–8.3)
RBC # BLD: 4.53 M/UL — SIGNIFICANT CHANGE UP (ref 3.8–5.2)
RBC # FLD: 12.5 % — SIGNIFICANT CHANGE UP (ref 10.3–14.5)
SODIUM SERPL-SCNC: 139 MMOL/L — SIGNIFICANT CHANGE UP (ref 135–145)
WBC # BLD: 5.76 K/UL — SIGNIFICANT CHANGE UP (ref 3.8–10.5)
WBC # FLD AUTO: 5.76 K/UL — SIGNIFICANT CHANGE UP (ref 3.8–10.5)

## 2024-06-07 PROCEDURE — 99222 1ST HOSP IP/OBS MODERATE 55: CPT | Mod: GC

## 2024-06-07 PROCEDURE — 74183 MRI ABD W/O CNTR FLWD CNTR: CPT | Mod: 26

## 2024-06-07 RX ORDER — ACETAMINOPHEN 500 MG
1000 TABLET ORAL EVERY 6 HOURS
Refills: 0 | Status: DISCONTINUED | OUTPATIENT
Start: 2024-06-07 | End: 2024-06-08

## 2024-06-07 RX ADMIN — PIPERACILLIN AND TAZOBACTAM 200 GRAM(S): 4; .5 INJECTION, POWDER, LYOPHILIZED, FOR SOLUTION INTRAVENOUS at 05:05

## 2024-06-07 RX ADMIN — Medication 1 TABLET(S): at 11:32

## 2024-06-07 RX ADMIN — PIPERACILLIN AND TAZOBACTAM 200 GRAM(S): 4; .5 INJECTION, POWDER, LYOPHILIZED, FOR SOLUTION INTRAVENOUS at 13:35

## 2024-06-07 RX ADMIN — Medication 1000 MILLIGRAM(S): at 23:39

## 2024-06-07 RX ADMIN — Medication 400 MILLIGRAM(S): at 23:09

## 2024-06-07 RX ADMIN — PIPERACILLIN AND TAZOBACTAM 200 GRAM(S): 4; .5 INJECTION, POWDER, LYOPHILIZED, FOR SOLUTION INTRAVENOUS at 22:00

## 2024-06-07 NOTE — PROGRESS NOTE ADULT - SUBJECTIVE AND OBJECTIVE BOX
B Team (General Surgery) Daily Progress Note    SUBJECTIVE:  Pt seen and examined, and is resting comfortably in bed. No acute events overnight. Pain is adequately controlled on current regimen. Pt has no complaints at this time.     OBJECTIVE:  Vital Signs Last 24 Hrs  T(C): 36.9 (07 Jun 2024 05:00), Max: 36.9 (07 Jun 2024 05:00)  T(F): 98.5 (07 Jun 2024 05:00), Max: 98.5 (07 Jun 2024 05:00)  HR: 65 (07 Jun 2024 05:00) (51 - 65)  BP: 128/75 (07 Jun 2024 05:00) (108/74 - 137/86)  BP(mean): --  RR: 17 (07 Jun 2024 05:00) (17 - 18)  SpO2: 100% (07 Jun 2024 05:00) (98% - 100%)    Parameters below as of 07 Jun 2024 05:00  Patient On (Oxygen Delivery Method): room air        I&O's Detail    06 Jun 2024 07:01  -  07 Jun 2024 07:00  --------------------------------------------------------  IN:    Lactated Ringers: 1625 mL  Total IN: 1625 mL    OUT:    Oral Fluid: 0 mL    Voided (mL): 200 mL  Total OUT: 200 mL    Total NET: 1425 mL        Exam:  GEN: NAD  HEENT: atraumatic, normocephalic  CV: pulse present regularly  RESP: no increased work of breathing, no use of accessory muscles  GI/ABD: soft, mild RUQ and epigastric TTP, nondistended  EXTREMITIES: warm, pink, well-perfused                        12.8   5.76  )-----------( 235      ( 07 Jun 2024 05:20 )             37.6       06-07    139  |  104  |  7   ----------------------------<  119<H>  3.6   |  22  |  0.58    Ca    8.5      07 Jun 2024 05:20  Phos  4.2     06-07  Mg     1.90     06-07    TPro  5.7<L>  /  Alb  3.3  /  TBili  1.9<H>  /  DBili  x   /  AST  174<H>  /  ALT  295<H>  /  AlkPhos  107  06-07      PT/INR - ( 06 Jun 2024 11:00 )   PT: 10.9 sec;   INR: 0.96 ratio         PTT - ( 06 Jun 2024 11:00 )  PTT:24.1 sec

## 2024-06-07 NOTE — PROGRESS NOTE ADULT - ASSESSMENT
19F w/ recent pregnancy p/w concern for choledocholithiasis. Labs notable for transaminitis and bilirubinemia    Plan:  - f/u MRCP  - Diet: NPO for MRCP  - Trend LFTs and bilirubin  - If (+) MRCP or uptrending bilirubin then will consult GI for ERCP, if downtrending then will plan for lap lucina  - Pain control  - Dvt ppx  - OOB/AAT    B Team Surgery,  [id number]

## 2024-06-08 ENCOUNTER — RESULT REVIEW (OUTPATIENT)
Age: 19
End: 2024-06-08

## 2024-06-08 LAB
ALBUMIN SERPL ELPH-MCNC: 3.6 G/DL — SIGNIFICANT CHANGE UP (ref 3.3–5)
ALP SERPL-CCNC: 113 U/L — SIGNIFICANT CHANGE UP (ref 40–120)
ALT FLD-CCNC: 253 U/L — HIGH (ref 4–33)
ANION GAP SERPL CALC-SCNC: 13 MMOL/L — SIGNIFICANT CHANGE UP (ref 7–14)
APTT BLD: 30.5 SEC — SIGNIFICANT CHANGE UP (ref 24.5–35.6)
AST SERPL-CCNC: 111 U/L — HIGH (ref 4–32)
BILIRUB SERPL-MCNC: 1.2 MG/DL — SIGNIFICANT CHANGE UP (ref 0.2–1.2)
BLD GP AB SCN SERPL QL: NEGATIVE — SIGNIFICANT CHANGE UP
BUN SERPL-MCNC: 5 MG/DL — LOW (ref 7–23)
CALCIUM SERPL-MCNC: 8.9 MG/DL — SIGNIFICANT CHANGE UP (ref 8.4–10.5)
CHLORIDE SERPL-SCNC: 104 MMOL/L — SIGNIFICANT CHANGE UP (ref 98–107)
CO2 SERPL-SCNC: 22 MMOL/L — SIGNIFICANT CHANGE UP (ref 22–31)
CREAT SERPL-MCNC: 0.52 MG/DL — SIGNIFICANT CHANGE UP (ref 0.5–1.3)
EGFR: 136 ML/MIN/1.73M2 — SIGNIFICANT CHANGE UP
GLUCOSE SERPL-MCNC: 111 MG/DL — HIGH (ref 70–99)
HCG SERPL-ACNC: <1 MIU/ML — SIGNIFICANT CHANGE UP
HCT VFR BLD CALC: 38.5 % — SIGNIFICANT CHANGE UP (ref 34.5–45)
HGB BLD-MCNC: 13.1 G/DL — SIGNIFICANT CHANGE UP (ref 11.5–15.5)
INR BLD: 1.04 RATIO — SIGNIFICANT CHANGE UP (ref 0.85–1.18)
MAGNESIUM SERPL-MCNC: 1.9 MG/DL — SIGNIFICANT CHANGE UP (ref 1.6–2.6)
MCHC RBC-ENTMCNC: 27.9 PG — SIGNIFICANT CHANGE UP (ref 27–34)
MCHC RBC-ENTMCNC: 34 GM/DL — SIGNIFICANT CHANGE UP (ref 32–36)
MCV RBC AUTO: 82.1 FL — SIGNIFICANT CHANGE UP (ref 80–100)
NRBC # BLD: 0 /100 WBCS — SIGNIFICANT CHANGE UP (ref 0–0)
NRBC # FLD: 0 K/UL — SIGNIFICANT CHANGE UP (ref 0–0)
PHOSPHATE SERPL-MCNC: 5.1 MG/DL — HIGH (ref 2.5–4.5)
PLATELET # BLD AUTO: 241 K/UL — SIGNIFICANT CHANGE UP (ref 150–400)
POTASSIUM SERPL-MCNC: 3.7 MMOL/L — SIGNIFICANT CHANGE UP (ref 3.5–5.3)
POTASSIUM SERPL-SCNC: 3.7 MMOL/L — SIGNIFICANT CHANGE UP (ref 3.5–5.3)
PROT SERPL-MCNC: 6.3 G/DL — SIGNIFICANT CHANGE UP (ref 6–8.3)
PROTHROM AB SERPL-ACNC: 11.7 SEC — SIGNIFICANT CHANGE UP (ref 9.5–13)
RBC # BLD: 4.69 M/UL — SIGNIFICANT CHANGE UP (ref 3.8–5.2)
RBC # FLD: 12.9 % — SIGNIFICANT CHANGE UP (ref 10.3–14.5)
RH IG SCN BLD-IMP: POSITIVE — SIGNIFICANT CHANGE UP
SODIUM SERPL-SCNC: 139 MMOL/L — SIGNIFICANT CHANGE UP (ref 135–145)
WBC # BLD: 6.42 K/UL — SIGNIFICANT CHANGE UP (ref 3.8–10.5)
WBC # FLD AUTO: 6.42 K/UL — SIGNIFICANT CHANGE UP (ref 3.8–10.5)

## 2024-06-08 PROCEDURE — 88304 TISSUE EXAM BY PATHOLOGIST: CPT | Mod: 26

## 2024-06-08 PROCEDURE — 99222 1ST HOSP IP/OBS MODERATE 55: CPT | Mod: GC

## 2024-06-08 PROCEDURE — 47562 LAPAROSCOPIC CHOLECYSTECTOMY: CPT | Mod: GC

## 2024-06-08 DEVICE — LIGATING CLIPS WECK HEMOLOK POLYMER LARGE (PURPLE) 6: Type: IMPLANTABLE DEVICE | Status: FUNCTIONAL

## 2024-06-08 RX ORDER — ONDANSETRON 8 MG/1
4 TABLET, FILM COATED ORAL EVERY 4 HOURS
Refills: 0 | Status: DISCONTINUED | OUTPATIENT
Start: 2024-06-08 | End: 2024-06-08

## 2024-06-08 RX ORDER — ENOXAPARIN SODIUM 100 MG/ML
40 INJECTION SUBCUTANEOUS EVERY 24 HOURS
Refills: 0 | Status: DISCONTINUED | OUTPATIENT
Start: 2024-06-08 | End: 2024-06-10

## 2024-06-08 RX ORDER — OXYCODONE HYDROCHLORIDE 5 MG/1
5 TABLET ORAL EVERY 6 HOURS
Refills: 0 | Status: DISCONTINUED | OUTPATIENT
Start: 2024-06-08 | End: 2024-06-08

## 2024-06-08 RX ORDER — ACETAMINOPHEN 500 MG
1000 TABLET ORAL EVERY 6 HOURS
Refills: 0 | Status: DISCONTINUED | OUTPATIENT
Start: 2024-06-08 | End: 2024-06-10

## 2024-06-08 RX ORDER — OXYCODONE HYDROCHLORIDE 5 MG/1
5 TABLET ORAL EVERY 4 HOURS
Refills: 0 | Status: DISCONTINUED | OUTPATIENT
Start: 2024-06-08 | End: 2024-06-12

## 2024-06-08 RX ORDER — POTASSIUM CHLORIDE 20 MEQ
10 PACKET (EA) ORAL
Refills: 0 | Status: COMPLETED | OUTPATIENT
Start: 2024-06-08 | End: 2024-06-08

## 2024-06-08 RX ORDER — OXYCODONE HYDROCHLORIDE 5 MG/1
2.5 TABLET ORAL EVERY 4 HOURS
Refills: 0 | Status: DISCONTINUED | OUTPATIENT
Start: 2024-06-08 | End: 2024-06-12

## 2024-06-08 RX ORDER — HYDROMORPHONE HYDROCHLORIDE 2 MG/ML
0.5 INJECTION INTRAMUSCULAR; INTRAVENOUS; SUBCUTANEOUS
Refills: 0 | Status: DISCONTINUED | OUTPATIENT
Start: 2024-06-08 | End: 2024-06-08

## 2024-06-08 RX ORDER — MAGNESIUM SULFATE 500 MG/ML
1 VIAL (ML) INJECTION ONCE
Refills: 0 | Status: COMPLETED | OUTPATIENT
Start: 2024-06-08 | End: 2024-06-08

## 2024-06-08 RX ORDER — OXYCODONE HYDROCHLORIDE 5 MG/1
5 TABLET ORAL ONCE
Refills: 0 | Status: DISCONTINUED | OUTPATIENT
Start: 2024-06-08 | End: 2024-06-08

## 2024-06-08 RX ORDER — HYDROMORPHONE HYDROCHLORIDE 2 MG/ML
1 INJECTION INTRAMUSCULAR; INTRAVENOUS; SUBCUTANEOUS
Refills: 0 | Status: DISCONTINUED | OUTPATIENT
Start: 2024-06-08 | End: 2024-06-08

## 2024-06-08 RX ADMIN — PIPERACILLIN AND TAZOBACTAM 200 GRAM(S): 4; .5 INJECTION, POWDER, LYOPHILIZED, FOR SOLUTION INTRAVENOUS at 05:42

## 2024-06-08 RX ADMIN — ENOXAPARIN SODIUM 40 MILLIGRAM(S): 100 INJECTION SUBCUTANEOUS at 17:16

## 2024-06-08 RX ADMIN — Medication 400 MILLIGRAM(S): at 05:42

## 2024-06-08 RX ADMIN — Medication 1000 MILLIGRAM(S): at 12:33

## 2024-06-08 RX ADMIN — Medication 1 TABLET(S): at 16:01

## 2024-06-08 RX ADMIN — OXYCODONE HYDROCHLORIDE 5 MILLIGRAM(S): 5 TABLET ORAL at 17:00

## 2024-06-08 RX ADMIN — Medication 100 GRAM(S): at 07:11

## 2024-06-08 RX ADMIN — Medication 100 MILLIEQUIVALENT(S): at 07:11

## 2024-06-08 RX ADMIN — Medication 1000 MILLIGRAM(S): at 23:05

## 2024-06-08 RX ADMIN — OXYCODONE HYDROCHLORIDE 5 MILLIGRAM(S): 5 TABLET ORAL at 16:01

## 2024-06-08 RX ADMIN — Medication 1000 MILLIGRAM(S): at 17:15

## 2024-06-08 RX ADMIN — Medication 400 MILLIGRAM(S): at 12:00

## 2024-06-08 RX ADMIN — Medication 1000 MILLIGRAM(S): at 06:12

## 2024-06-08 RX ADMIN — Medication 1000 MILLIGRAM(S): at 23:35

## 2024-06-08 NOTE — CONSULT NOTE ADULT - PROVIDER SPECIALTY LIST ADULT
Gastroenterology
82-year-old male with history of CABG and COPD with increased work of breathing and wheezing x1 day.  Will check labs, x-ray, nebs as needed, steroids rule out pneumonia, infectious etiology, COPD exacerbation.

## 2024-06-08 NOTE — CHART NOTE - NSCHARTNOTEFT_GEN_A_CORE
General Surgery Post op Check    Pt seen and examined. Reports abd pain, reports that she had not been taking pain medications. Encouraged to take medications. Denies SOB/CP/N/V.     Vital Signs Last 24 Hrs  T(C): 36.6 (08 Jun 2024 13:56), Max: 37 (07 Jun 2024 21:40)  T(F): 97.8 (08 Jun 2024 13:56), Max: 98.6 (07 Jun 2024 21:40)  HR: 77 (08 Jun 2024 13:56) (48 - 84)  BP: 145/97 (08 Jun 2024 13:56) (119/76 - 145/97)  BP(mean): 96 (08 Jun 2024 13:00) (87 - 96)  RR: 15 (08 Jun 2024 13:56) (14 - 21)  SpO2: 100% (08 Jun 2024 13:56) (95% - 100%)    Parameters below as of 08 Jun 2024 13:56  Patient On (Oxygen Delivery Method): room air        I&O's Summary    07 Jun 2024 07:01  -  08 Jun 2024 07:00  --------------------------------------------------------  IN: 2500 mL / OUT: 1050 mL / NET: 1450 mL    08 Jun 2024 07:01  -  08 Jun 2024 15:48  --------------------------------------------------------  IN: 0 mL / OUT: 300 mL / NET: -300 mL        Physical Exam  Gen: NAD, A&Ox3  Pulm: No respiratory distress, no subcostal retractions  CV: RRR, no JVD  Abd: Soft, mild incisional tenderness, ND  Extremities:  FROM, warm and well perfused, equal bilateral muscle strength      A/P: 19y Female s/p lap lucina  -DVT ppx: lovenox  -Strict I&O's  -Analgesia and antiemetics as needed  -Regular Diet  -OOB/AAT  -F/u GI recommendations    B Team Surgery,  [id number] General Surgery Post op Check    Pt seen and examined. Reports abd pain, reports that she had not been taking pain medications. Encouraged to take medications. Denies SOB/CP/N/V.     Vital Signs Last 24 Hrs  T(C): 36.6 (08 Jun 2024 13:56), Max: 37 (07 Jun 2024 21:40)  T(F): 97.8 (08 Jun 2024 13:56), Max: 98.6 (07 Jun 2024 21:40)  HR: 77 (08 Jun 2024 13:56) (48 - 84)  BP: 145/97 (08 Jun 2024 13:56) (119/76 - 145/97)  BP(mean): 96 (08 Jun 2024 13:00) (87 - 96)  RR: 15 (08 Jun 2024 13:56) (14 - 21)  SpO2: 100% (08 Jun 2024 13:56) (95% - 100%)    Parameters below as of 08 Jun 2024 13:56  Patient On (Oxygen Delivery Method): room air        I&O's Summary    07 Jun 2024 07:01  -  08 Jun 2024 07:00  --------------------------------------------------------  IN: 2500 mL / OUT: 1050 mL / NET: 1450 mL    08 Jun 2024 07:01  -  08 Jun 2024 15:48  --------------------------------------------------------  IN: 0 mL / OUT: 300 mL / NET: -300 mL        Physical Exam  Gen: NAD, A&Ox3  Pulm: No respiratory distress, no subcostal retractions  CV: RRR, no JVD  Abd: Soft, mild incisional tenderness, ND  Extremities:  FROM, warm and well perfused, equal bilateral muscle strength      A/P: 19y Female s/p robot-assisted lap lucina  -DVT ppx: lovenox  -Strict I&O's  -Analgesia and antiemetics as needed  -Regular Diet  -OOB/AAT  -F/u GI recommendations    B Team Surgery,  [id number]

## 2024-06-08 NOTE — CONSULT NOTE ADULT - SUBJECTIVE AND OBJECTIVE BOX
HPI:  JOSELUIS BROOKS is a 19 year old female w/ recent vaginal delivery (5/8/24) p/w epigastric and RUQ abdominal pain that started 2 days prior to admission.     Patient had nausea and vomiting with the RUQ pain, and no prior similar episodes or sick contacts. Denied fever/chill/diarrhea/melena/hematochezia/hematemesis.   No ETOH/smoking  No NSAIDs use, or A/C use.  No prior EGD.  No family hx of GI-related cancers.     Patient is s/p lap lucina, and reported to have tenderness at surgical site. Otherwise no nausea/vomiting now.     PMHX/PSHX:    No pertinent past medical history    No significant past surgical history      Allergies:  No Known Allergies      Home Medications: reviewed  Hospital Medications:  acetaminophen     Tablet .. 1000 milliGRAM(s) Oral every 6 hours  enoxaparin Injectable 40 milliGRAM(s) SubCutaneous every 24 hours  oxyCODONE    IR 2.5 milliGRAM(s) Oral every 4 hours PRN  oxyCODONE    IR 5 milliGRAM(s) Oral every 4 hours PRN  prenatal multivitamin 1 Tablet(s) Oral daily        PHYSICAL EXAM:   Vital Signs:  Vital Signs Last 24 Hrs  T(C): 36.6 (08 Jun 2024 13:56), Max: 37 (07 Jun 2024 21:40)  T(F): 97.8 (08 Jun 2024 13:56), Max: 98.6 (07 Jun 2024 21:40)  HR: 77 (08 Jun 2024 13:56) (48 - 84)  BP: 145/97 (08 Jun 2024 13:56) (119/76 - 145/97)  BP(mean): 96 (08 Jun 2024 13:00) (87 - 96)  RR: 15 (08 Jun 2024 13:56) (14 - 21)  SpO2: 100% (08 Jun 2024 13:56) (95% - 100%)    Parameters below as of 08 Jun 2024 13:56  Patient On (Oxygen Delivery Method): room air      Daily Height in cm: 160 (08 Jun 2024 06:39)    Daily     GENERAL: no acute distress at rest  NEURO: alert and oriented x 3  HEENT: NCAT, no conjunctival pallor appreciated; anicteric sclera  CHEST: no respiratory distress, no accessory muscle use  CARDIAC: regular rate, +S1/S2  ABDOMEN: Declined abdominal exam. Self-palpation elicited tenderness at epigastrium and R abdomen.   EXTREMITIES: warm, well perfused  SKIN: no lesions except surgical sites    LABS: reviewed                        13.1   6.42  )-----------( 241      ( 08 Jun 2024 04:16 )             38.5     06-08    139  |  104  |  5<L>  ----------------------------<  111<H>  3.7   |  22  |  0.52    Ca    8.9      08 Jun 2024 04:16  Phos  5.1     06-08  Mg     1.90     06-08    TPro  6.3  /  Alb  3.6  /  TBili  1.2  /  DBili  x   /  AST  111<H>  /  ALT  253<H>  /  AlkPhos  113  06-08    LIVER FUNCTIONS - ( 08 Jun 2024 04:16 )  Alb: 3.6 g/dL / Pro: 6.3 g/dL / ALK PHOS: 113 U/L / ALT: 253 U/L / AST: 111 U/L / GGT: x             < from: MR MRCP w/wo IV Cont (06.07.24 @ 17:37) >  FINDINGS:  LOWER CHEST: Within normal limits.    LIVER: Mildly enlarged, measures 19.0 cm craniocaudally in the right   lobe. Diffuse steatosis. Arterial hyperenhancement along the gallbladder   fossa, likely reactive to the gallbladder.  BILE DUCTS: Mild intrahepatic and extrahepatic biliary duct dilation, the   upper extrahepatic duct measuring up to 8 mm in caliber. Question a   filling defect measuring approximately 3 mm in the distal CBD near the   ampulla (series 3 image 20, series 15 image 19).  GALLBLADDER: Cholelithiasis. Edematous wall thickening and mild   pericholecystic edema.  SPLEEN: Within normal limits.  PANCREAS: Within normal limits.  ADRENALS: Within normal limits.  KIDNEYS/URETERS: Within normal limits.    VISUALIZED PORTIONS:  BOWEL: Within normal limits.  PERITONEUM: No ascites.  VESSELS: Within normal limits.  RETROPERITONEUM/LYMPH NODES: No lymphadenopathy.  ABDOMINAL WALL: Diastasis recti with bulging of the transverse colon.  BONES: Within normal limits.    IMPRESSION:  *  Mild intrahepatic and extrahepatic biliary duct dilation. Question a   small filling defect in the distal CBD near the ampulla, possibly   reflecting choledocholithiasis.  *  Cholelithiasis and findings concerning for acute cholecystitis.  *  Hepatomegaly and hepatic steatosis.        --- End of Report ---    < end of copied text >

## 2024-06-08 NOTE — CONSULT NOTE ADULT - ASSESSMENT
19 year old female w/ recent vaginal delivery (5/8/24) p/w epigastric and RUQ abdominal pain that started 2 days prior to admission.     GI was consulted for choledocholithiasis.    Assessment  #Acute cholecystitis s/p lap lucina  #choledocholithiasis  - S/p lap lucina 6/8 with tender surgical sites  - Not cholangitic  - No nausea/vomiting at this time  - Liver enzymes were downtrending prior to lap lucina; however minor elevation post-op may be anticipated    Recommendations  - Trend CMP daily  - Will re-evaluate tomorrow for timing of ERCP 2/2 not acutely cholangitic and immediately post-op  - Diet per surgery team  - Symptomatic control per surgery team    Note incomplete until finalized by attending signature/attestation.    Tran Krause  GI/Hepatology Fellow    MONDAY-FRIDAY 8AM-5PM:  Pager# [phone] ([facility]) or [phone] ([facility])    NON-URGENT CONSULTS:  Please email [email or web address]  AT NIGHT AND ON WEEKENDS:  Contact on-call GI fellow via AMION by paging or hospital  from 5pm-8am and on weekends/holidays

## 2024-06-08 NOTE — BRIEF OPERATIVE NOTE - OPERATION/FINDINGS
s/p robotic lap lucina. 12mm supraumbilical trocar via Misti entry, 8mm ports x2 +5mm port x 1. gallbladder notably edematous and inflamed. critical view of safety obtained. cystic duct ligated with 5mm hemolock clips. cystic artery cauterized

## 2024-06-08 NOTE — CHART NOTE - NSCHARTNOTEFT_GEN_A_CORE
Attempted to see patient in AM, but patient was already transported to pre-op for planned lap lucina today.    Will attempt again later today vs. tomorrow when patient is optimized post-op.

## 2024-06-08 NOTE — PROGRESS NOTE ADULT - SUBJECTIVE AND OBJECTIVE BOX
B Team (General Surgery) Daily Progress Note    SUBJECTIVE:  Pt seen and examined, and is resting comfortably in bed. No acute events overnight. Pain is adequately controlled on current regimen. Pt has no complaints at this time.     OBJECTIVE:  Vital Signs Last 24 Hrs  T(C): 36.7 (08 Jun 2024 07:59), Max: 37 (07 Jun 2024 21:40)  T(F): 98.1 (08 Jun 2024 07:59), Max: 98.6 (07 Jun 2024 21:40)  HR: 48 (08 Jun 2024 07:59) (48 - 67)  BP: 136/82 (08 Jun 2024 07:59) (112/70 - 136/82)  BP(mean): --  RR: 14 (08 Jun 2024 07:59) (14 - 18)  SpO2: 97% (08 Jun 2024 07:59) (97% - 100%)    Parameters below as of 08 Jun 2024 07:59  Patient On (Oxygen Delivery Method): room air        I&O's Detail    07 Jun 2024 07:01  -  08 Jun 2024 07:00  --------------------------------------------------------  IN:    Lactated Ringers: 2500 mL  Total IN: 2500 mL    OUT:    Oral Fluid: 0 mL    Voided (mL): 1050 mL  Total OUT: 1050 mL    Total NET: 1450 mL        Exam:  GEN: NAD  HEENT: atraumatic, normocephalic  CV: pulse present regularly  RESP: no increased work of breathing, no use of accessory muscles  GI/ABD: soft, nontender, nondistended  EXTREMITIES: warm, pink, well-perfused                        13.1   6.42  )-----------( 241      ( 08 Jun 2024 04:16 )             38.5       06-08    139  |  104  |  5<L>  ----------------------------<  111<H>  3.7   |  22  |  0.52    Ca    8.9      08 Jun 2024 04:16  Phos  5.1     06-08  Mg     1.90     06-08    TPro  6.3  /  Alb  3.6  /  TBili  1.2  /  DBili  x   /  AST  111<H>  /  ALT  253<H>  /  AlkPhos  113  06-08      PT/INR - ( 08 Jun 2024 04:16 )   PT: 11.7 sec;   INR: 1.04 ratio         PTT - ( 08 Jun 2024 04:16 )  PTT:30.5 sec

## 2024-06-09 LAB
ALBUMIN SERPL ELPH-MCNC: 3.6 G/DL — SIGNIFICANT CHANGE UP (ref 3.3–5)
ALP SERPL-CCNC: 139 U/L — HIGH (ref 40–120)
ALT FLD-CCNC: 294 U/L — HIGH (ref 4–33)
ANION GAP SERPL CALC-SCNC: 13 MMOL/L — SIGNIFICANT CHANGE UP (ref 7–14)
AST SERPL-CCNC: 220 U/L — HIGH (ref 4–32)
BILIRUB SERPL-MCNC: 1 MG/DL — SIGNIFICANT CHANGE UP (ref 0.2–1.2)
BUN SERPL-MCNC: 5 MG/DL — LOW (ref 7–23)
CALCIUM SERPL-MCNC: 9 MG/DL — SIGNIFICANT CHANGE UP (ref 8.4–10.5)
CHLORIDE SERPL-SCNC: 102 MMOL/L — SIGNIFICANT CHANGE UP (ref 98–107)
CO2 SERPL-SCNC: 23 MMOL/L — SIGNIFICANT CHANGE UP (ref 22–31)
CREAT SERPL-MCNC: 0.52 MG/DL — SIGNIFICANT CHANGE UP (ref 0.5–1.3)
EGFR: 136 ML/MIN/1.73M2 — SIGNIFICANT CHANGE UP
GLUCOSE SERPL-MCNC: 102 MG/DL — HIGH (ref 70–99)
HCT VFR BLD CALC: 40.3 % — SIGNIFICANT CHANGE UP (ref 34.5–45)
HGB BLD-MCNC: 13.3 G/DL — SIGNIFICANT CHANGE UP (ref 11.5–15.5)
MAGNESIUM SERPL-MCNC: 2.2 MG/DL — SIGNIFICANT CHANGE UP (ref 1.6–2.6)
MCHC RBC-ENTMCNC: 27.7 PG — SIGNIFICANT CHANGE UP (ref 27–34)
MCHC RBC-ENTMCNC: 33 GM/DL — SIGNIFICANT CHANGE UP (ref 32–36)
MCV RBC AUTO: 84 FL — SIGNIFICANT CHANGE UP (ref 80–100)
NRBC # BLD: 0 /100 WBCS — SIGNIFICANT CHANGE UP (ref 0–0)
NRBC # FLD: 0 K/UL — SIGNIFICANT CHANGE UP (ref 0–0)
PHOSPHATE SERPL-MCNC: 3.8 MG/DL — SIGNIFICANT CHANGE UP (ref 2.5–4.5)
PLATELET # BLD AUTO: 267 K/UL — SIGNIFICANT CHANGE UP (ref 150–400)
POTASSIUM SERPL-MCNC: 3.8 MMOL/L — SIGNIFICANT CHANGE UP (ref 3.5–5.3)
POTASSIUM SERPL-SCNC: 3.8 MMOL/L — SIGNIFICANT CHANGE UP (ref 3.5–5.3)
PROT SERPL-MCNC: 6.7 G/DL — SIGNIFICANT CHANGE UP (ref 6–8.3)
RBC # BLD: 4.8 M/UL — SIGNIFICANT CHANGE UP (ref 3.8–5.2)
RBC # FLD: 12.6 % — SIGNIFICANT CHANGE UP (ref 10.3–14.5)
SODIUM SERPL-SCNC: 138 MMOL/L — SIGNIFICANT CHANGE UP (ref 135–145)
WBC # BLD: 7.86 K/UL — SIGNIFICANT CHANGE UP (ref 3.8–10.5)
WBC # FLD AUTO: 7.86 K/UL — SIGNIFICANT CHANGE UP (ref 3.8–10.5)

## 2024-06-09 PROCEDURE — 99232 SBSQ HOSP IP/OBS MODERATE 35: CPT | Mod: GC

## 2024-06-09 RX ADMIN — OXYCODONE HYDROCHLORIDE 5 MILLIGRAM(S): 5 TABLET ORAL at 18:18

## 2024-06-09 RX ADMIN — Medication 1000 MILLIGRAM(S): at 11:57

## 2024-06-09 RX ADMIN — Medication 1 TABLET(S): at 11:57

## 2024-06-09 RX ADMIN — Medication 1000 MILLIGRAM(S): at 23:26

## 2024-06-09 RX ADMIN — OXYCODONE HYDROCHLORIDE 5 MILLIGRAM(S): 5 TABLET ORAL at 01:27

## 2024-06-09 RX ADMIN — Medication 1000 MILLIGRAM(S): at 05:39

## 2024-06-09 RX ADMIN — OXYCODONE HYDROCHLORIDE 5 MILLIGRAM(S): 5 TABLET ORAL at 10:12

## 2024-06-09 RX ADMIN — ENOXAPARIN SODIUM 40 MILLIGRAM(S): 100 INJECTION SUBCUTANEOUS at 17:18

## 2024-06-09 RX ADMIN — OXYCODONE HYDROCHLORIDE 5 MILLIGRAM(S): 5 TABLET ORAL at 01:57

## 2024-06-09 RX ADMIN — Medication 1000 MILLIGRAM(S): at 23:56

## 2024-06-09 RX ADMIN — OXYCODONE HYDROCHLORIDE 5 MILLIGRAM(S): 5 TABLET ORAL at 17:18

## 2024-06-09 RX ADMIN — Medication 1000 MILLIGRAM(S): at 17:18

## 2024-06-09 RX ADMIN — OXYCODONE HYDROCHLORIDE 5 MILLIGRAM(S): 5 TABLET ORAL at 11:12

## 2024-06-09 RX ADMIN — Medication 1000 MILLIGRAM(S): at 05:09

## 2024-06-09 NOTE — PROGRESS NOTE ADULT - SUBJECTIVE AND OBJECTIVE BOX
Interval Events:   No acute events overnight.    Patient reports abdominal pain is better, non nausea, vomiting and tolerated PO last night.     Hospital Medications:  acetaminophen     Tablet .. 1000 milliGRAM(s) Oral every 6 hours  enoxaparin Injectable 40 milliGRAM(s) SubCutaneous every 24 hours  oxyCODONE    IR 2.5 milliGRAM(s) Oral every 4 hours PRN  oxyCODONE    IR 5 milliGRAM(s) Oral every 4 hours PRN  prenatal multivitamin 1 Tablet(s) Oral daily      PHYSICAL EXAM:   Vital Signs:  Vital Signs Last 24 Hrs  T(C): 36.7 (09 Jun 2024 06:16), Max: 37 (08 Jun 2024 22:00)  T(F): 98.1 (09 Jun 2024 06:16), Max: 98.6 (08 Jun 2024 22:00)  HR: 55 (09 Jun 2024 06:16) (55 - 84)  BP: 133/85 (09 Jun 2024 06:16) (120/74 - 145/97)  BP(mean): 96 (08 Jun 2024 13:00) (87 - 96)  RR: 16 (09 Jun 2024 06:16) (15 - 21)  SpO2: 100% (09 Jun 2024 06:16) (95% - 100%)    Parameters below as of 09 Jun 2024 06:16  Patient On (Oxygen Delivery Method): room air      Daily     Daily     GENERAL: no acute distress at rest  NEURO: alert and oriented x 3  HEENT: NCAT, no conjunctival pallor appreciated; anicteric sclera  CHEST: no respiratory distress, no accessory muscle use  CARDIAC: regular rate, +S1/S2  ABDOMEN: Self-palpation - improved and less tenderness  EXTREMITIES: warm, well perfused  SKIN: no lesions except surgical sites    LABS: reviewed                        13.3   7.86  )-----------( 267      ( 09 Jun 2024 05:57 )             40.3     06-09    138  |  102  |  5<L>  ----------------------------<  102<H>  3.8   |  23  |  0.52    Ca    9.0      09 Jun 2024 05:57  Phos  3.8     06-09  Mg     2.20     06-09    TPro  6.7  /  Alb  3.6  /  TBili  1.0  /  DBili  x   /  AST  220<H>  /  ALT  294<H>  /  AlkPhos  139<H>  06-09

## 2024-06-09 NOTE — PROGRESS NOTE ADULT - SUBJECTIVE AND OBJECTIVE BOX
General Surgery Daily Resident Progress Note    Subjective and Interval  Seen and examined at bedside.   ___________________________________________________  Vital Signs  T(C): 36.9 (02:00), Max: 37 (22:00)  HR: 60 (02:00) (48 - 84)  BP: 124/80 (02:00) (120/74 - 145/97)  RR: 17 (02:00) (14 - 21)  SpO2: 100% (02:00) (95% - 100%)    Physical Exam  General:  Cardiac:  Respiratory:  Abdomen:  Back:  Extremities: General Surgery Daily Resident Progress Note    Subjective and Interval  Seen and examined at bedside. Pain well-controlled, denies nausea or vomiting.   ___________________________________________________  Vital Signs  T(C): 36.9 (02:00), Max: 37 (22:00)  HR: 60 (02:00) (48 - 84)  BP: 124/80 (02:00) (120/74 - 145/97)  RR: 17 (02:00) (14 - 21)  SpO2: 100% (02:00) (95% - 100%)    Physical Exam  General: Resting comfortably in no acute distress.  Cardiac: Warm and well-perfused.   Respiratory: Equal chest wall expansion bilaterally with no tracheal deviation, no accessory muscle use, and no grossly increased work of breathing.   Abdomen: Soft, nontender and nondistended. No rebound tenderness or guarding is elicited.

## 2024-06-09 NOTE — PROGRESS NOTE ADULT - ASSESSMENT
19 year old female w/ recent vaginal delivery (5/8/24) p/w epigastric and RUQ abdominal pain that started 2 days prior to admission.     GI was consulted for choledocholithiasis.    Assessment  #Acute cholecystitis s/p lap lucina  #choledocholithiasis  - S/p lap lucina 6/8 with tender surgical sites  - Not cholangitic  - No nausea/vomiting at this time  - Liver enzymes were downtrending prior to lap lucina; however minor elevation post-op may be anticipated  - Bili downtrended with ALP/AST/ALT uptrend (could be 2/2 lap lucina)    Recommendations  - Trend CMP daily  - Please obtain repeat MRCP to assess for CBD stone given normalized bili - if no stone present, no indication for ERCP  - Diet per surgery team  - Symptomatic control per surgery team    D/w Dr. Sultan Tran Krause  GI/Hepatology Fellow    MONDAY-FRIDAY 8AM-5PM:  Pager# [phone] ([facility]) or [phone] ([facility])    NON-URGENT CONSULTS:  Please email [email or web address]  AT NIGHT AND ON WEEKENDS:  Contact on-call GI fellow via AMION by paging or hospital  from 5pm-8am and on weekends/holidays

## 2024-06-09 NOTE — PROGRESS NOTE ADULT - ASSESSMENT
Ms. Holguin is a 19 year old woman who is recently post-partum (5/8/2024, induced vaginal delivery for PROM) admitted for acute cholecystitis found to have hyperbilirubinemia and MRCP concerning for possible choledocholithiasis, now seen following robot-assisted laparoscopic cholecystectomy (Dr. Valadez, 6/8/2024). Tolerating a regular diet, pain well-controlled, remains admitted with GI following for ongoing evaluation of choledocholithiasis, possible ERCP pending assessment by GI based on liver chemistries.     - Regular diet.   - Continue to monitor strict intake and output.   - Daily complete metabolic panel, liver chemistries ordered to trend fractionated bilirubin.   - GI following, appreciate recommendations.     Rylan Villalobos, PGY-1  Acute Care Surgery ([id number])   Ms. Holguin is a 19 year old woman who is recently post-partum (5/8/2024, induced vaginal delivery for PROM) admitted for acute cholecystitis found to have hyperbilirubinemia and MRCP concerning for possible choledocholithiasis, now seen following robot-assisted laparoscopic cholecystectomy (Dr. Valadez, 6/8/2024). Tolerating a regular diet, pain well-controlled, remains admitted with GI following for ongoing evaluation of choledocholithiasis, possible ERCP pending assessment by GI based on liver chemistries.     - Regular diet.   - Continue to monitor strict intake and output.   - Daily complete metabolic panel, liver chemistries ordered to trend fractionated bilirubin.   - GI following, appreciate recommendations. Per note from today will obtain repeat MRCP.     Rylan Villalobos, PGY-1  Acute Care Surgery ([id number])

## 2024-06-10 LAB
ALBUMIN SERPL ELPH-MCNC: 4 G/DL — SIGNIFICANT CHANGE UP (ref 3.3–5)
ALP SERPL-CCNC: 182 U/L — HIGH (ref 40–120)
ALT FLD-CCNC: 935 U/L — HIGH (ref 4–33)
ANION GAP SERPL CALC-SCNC: 13 MMOL/L — SIGNIFICANT CHANGE UP (ref 7–14)
AST SERPL-CCNC: 1301 U/L — HIGH (ref 4–32)
BILIRUB DIRECT SERPL-MCNC: 1.2 MG/DL — HIGH (ref 0–0.3)
BILIRUB INDIRECT FLD-MCNC: 0.6 MG/DL — SIGNIFICANT CHANGE UP (ref 0–1)
BILIRUB SERPL-MCNC: 1.8 MG/DL — HIGH (ref 0.2–1.2)
BUN SERPL-MCNC: 7 MG/DL — SIGNIFICANT CHANGE UP (ref 7–23)
CALCIUM SERPL-MCNC: 9.1 MG/DL — SIGNIFICANT CHANGE UP (ref 8.4–10.5)
CHLORIDE SERPL-SCNC: 103 MMOL/L — SIGNIFICANT CHANGE UP (ref 98–107)
CO2 SERPL-SCNC: 22 MMOL/L — SIGNIFICANT CHANGE UP (ref 22–31)
CREAT SERPL-MCNC: 0.46 MG/DL — LOW (ref 0.5–1.3)
EGFR: 140 ML/MIN/1.73M2 — SIGNIFICANT CHANGE UP
GLUCOSE SERPL-MCNC: 138 MG/DL — HIGH (ref 70–99)
HCT VFR BLD CALC: 40.7 % — SIGNIFICANT CHANGE UP (ref 34.5–45)
HGB BLD-MCNC: 13.3 G/DL — SIGNIFICANT CHANGE UP (ref 11.5–15.5)
MAGNESIUM SERPL-MCNC: 1.8 MG/DL — SIGNIFICANT CHANGE UP (ref 1.6–2.6)
MCHC RBC-ENTMCNC: 27.9 PG — SIGNIFICANT CHANGE UP (ref 27–34)
MCHC RBC-ENTMCNC: 32.7 GM/DL — SIGNIFICANT CHANGE UP (ref 32–36)
MCV RBC AUTO: 85.3 FL — SIGNIFICANT CHANGE UP (ref 80–100)
NRBC # BLD: 0 /100 WBCS — SIGNIFICANT CHANGE UP (ref 0–0)
NRBC # FLD: 0 K/UL — SIGNIFICANT CHANGE UP (ref 0–0)
PHOSPHATE SERPL-MCNC: 3.8 MG/DL — SIGNIFICANT CHANGE UP (ref 2.5–4.5)
PLATELET # BLD AUTO: 264 K/UL — SIGNIFICANT CHANGE UP (ref 150–400)
POTASSIUM SERPL-MCNC: 3.8 MMOL/L — SIGNIFICANT CHANGE UP (ref 3.5–5.3)
POTASSIUM SERPL-SCNC: 3.8 MMOL/L — SIGNIFICANT CHANGE UP (ref 3.5–5.3)
PROT SERPL-MCNC: 7 G/DL — SIGNIFICANT CHANGE UP (ref 6–8.3)
RBC # BLD: 4.77 M/UL — SIGNIFICANT CHANGE UP (ref 3.8–5.2)
RBC # FLD: 13.1 % — SIGNIFICANT CHANGE UP (ref 10.3–14.5)
SODIUM SERPL-SCNC: 138 MMOL/L — SIGNIFICANT CHANGE UP (ref 135–145)
WBC # BLD: 4.41 K/UL — SIGNIFICANT CHANGE UP (ref 3.8–10.5)
WBC # FLD AUTO: 4.41 K/UL — SIGNIFICANT CHANGE UP (ref 3.8–10.5)

## 2024-06-10 PROCEDURE — 74183 MRI ABD W/O CNTR FLWD CNTR: CPT | Mod: 26

## 2024-06-10 RX ORDER — POTASSIUM CHLORIDE 20 MEQ
20 PACKET (EA) ORAL ONCE
Refills: 0 | Status: COMPLETED | OUTPATIENT
Start: 2024-06-10 | End: 2024-06-10

## 2024-06-10 RX ORDER — DEXTROSE MONOHYDRATE, SODIUM CHLORIDE, AND POTASSIUM CHLORIDE 50; .745; 4.5 G/1000ML; G/1000ML; G/1000ML
1000 INJECTION, SOLUTION INTRAVENOUS
Refills: 0 | Status: DISCONTINUED | OUTPATIENT
Start: 2024-06-11 | End: 2024-06-12

## 2024-06-10 RX ORDER — ENOXAPARIN SODIUM 100 MG/ML
40 INJECTION SUBCUTANEOUS ONCE
Refills: 0 | Status: COMPLETED | OUTPATIENT
Start: 2024-06-10 | End: 2024-06-10

## 2024-06-10 RX ORDER — LIDOCAINE 4 G/100G
1 CREAM TOPICAL DAILY
Refills: 0 | Status: DISCONTINUED | OUTPATIENT
Start: 2024-06-10 | End: 2024-06-12

## 2024-06-10 RX ORDER — DEXTROSE MONOHYDRATE, SODIUM CHLORIDE, AND POTASSIUM CHLORIDE 50; .745; 4.5 G/1000ML; G/1000ML; G/1000ML
1000 INJECTION, SOLUTION INTRAVENOUS
Refills: 0 | Status: DISCONTINUED | OUTPATIENT
Start: 2024-06-10 | End: 2024-06-10

## 2024-06-10 RX ORDER — MAGNESIUM SULFATE 500 MG/ML
1 VIAL (ML) INJECTION ONCE
Refills: 0 | Status: COMPLETED | OUTPATIENT
Start: 2024-06-10 | End: 2024-06-10

## 2024-06-10 RX ADMIN — Medication 20 MILLIEQUIVALENT(S): at 17:04

## 2024-06-10 RX ADMIN — LIDOCAINE 1 PATCH: 4 CREAM TOPICAL at 19:58

## 2024-06-10 RX ADMIN — OXYCODONE HYDROCHLORIDE 5 MILLIGRAM(S): 5 TABLET ORAL at 23:10

## 2024-06-10 RX ADMIN — OXYCODONE HYDROCHLORIDE 5 MILLIGRAM(S): 5 TABLET ORAL at 22:39

## 2024-06-10 RX ADMIN — Medication 1 TABLET(S): at 11:19

## 2024-06-10 RX ADMIN — LIDOCAINE 1 PATCH: 4 CREAM TOPICAL at 18:50

## 2024-06-10 RX ADMIN — OXYCODONE HYDROCHLORIDE 5 MILLIGRAM(S): 5 TABLET ORAL at 17:04

## 2024-06-10 RX ADMIN — Medication 1000 MILLIGRAM(S): at 05:41

## 2024-06-10 RX ADMIN — ENOXAPARIN SODIUM 40 MILLIGRAM(S): 100 INJECTION SUBCUTANEOUS at 17:04

## 2024-06-10 RX ADMIN — Medication 100 GRAM(S): at 13:38

## 2024-06-10 RX ADMIN — Medication 1000 MILLIGRAM(S): at 06:32

## 2024-06-10 RX ADMIN — Medication 1000 MILLIGRAM(S): at 11:19

## 2024-06-10 RX ADMIN — OXYCODONE HYDROCHLORIDE 5 MILLIGRAM(S): 5 TABLET ORAL at 18:04

## 2024-06-10 NOTE — PROGRESS NOTE ADULT - SUBJECTIVE AND OBJECTIVE BOX
TEAM [ B] Surgery Daily Progress Note  =====================================================    SUBJECTIVE: Patient seen and examined at bedside on AM rounds. Patient reports feeling well, pain controlled on current regimen. Tolerating diet, denies nausea, vomiting. OOB/Amublating as tolerated. Denies fever, chills.    ALLERGIES:  No Known Allergies      --------------------------------------------------------------------------------------    MEDICATIONS:    Neurologic Medications  acetaminophen     Tablet .. 1000 milliGRAM(s) Oral every 6 hours  oxyCODONE    IR 2.5 milliGRAM(s) Oral every 4 hours PRN Moderate Pain (4 - 6)  oxyCODONE    IR 5 milliGRAM(s) Oral every 4 hours PRN Severe Pain (7 - 10)    Respiratory Medications    Cardiovascular Medications    Gastrointestinal Medications  prenatal multivitamin 1 Tablet(s) Oral daily    Genitourinary Medications    Hematologic/Oncologic Medications  enoxaparin Injectable 40 milliGRAM(s) SubCutaneous once    Antimicrobial/Immunologic Medications    Endocrine/Metabolic Medications    Topical/Other Medications    --------------------------------------------------------------------------------------    VITAL SIGNS:  T(C): 36.6 (06-10-24 @ 06:28), Max: 36.9 (06-09-24 @ 18:00)  HR: 63 (06-10-24 @ 06:28) (60 - 75)  BP: 145/103 (06-10-24 @ 06:28) (133/97 - 153/93)  RR: 19 (06-10-24 @ 06:28) (17 - 19)  SpO2: 100% (06-10-24 @ 06:28) (99% - 100%)  --------------------------------------------------------------------------------------    EXAM    General: NAD, resting in bed comfortably.  Cardiac: regular rate, warm and well perfused  Respiratory: Nonlabored respirations, normal cw expansion.  Abdomen: soft,  mild tender, nondistended.   Extremities: normal strength, FROM, no deformities    --------------------------------------------------------------------------------------    LABS                        13.3   4.41  )-----------( 264      ( 10 Master 2024 05:50 )             40.7   06-10    138  |  103  |  7   ----------------------------<  138<H>  3.8   |  22  |  0.46<L>    Ca    9.1      10 Master 2024 05:50  Phos  3.8     06-10  Mg     1.80     06-10    TPro  7.0  /  Alb  4.0  /  TBili  1.8<H>  /  DBili  1.2<H>  /  AST  1301<H>  /  ALT  935<H>  /  AlkPhos  182<H>  06-10      --------------------------------------------------------------------------------------    INS AND OUTS:    06-09-24 @ 07:01  -  06-10-24 @ 07:00  --------------------------------------------------------  IN: 440 mL / OUT: 0 mL / NET: 440 mL      --------------------------------------------------------------------------------------

## 2024-06-10 NOTE — CHART NOTE - NSCHARTNOTEFT_GEN_A_CORE
19 year old female w/ recent vaginal delivery (5/8/24) p/w epigastric and RUQ abdominal pain that started 2 days prior to admission. Now s/p robotic assisted cholecystectomy. GI was consulted for choledocholithiasis.    Assessment  #Acute cholecystitis s/p lap lucina  #choledocholithiasis  - S/p robotic assisted lucina 6/8 with tender surgical sites  - Liver enzymes were downtrending prior to lap lucina; however minor elevation post-op may be anticipated. Liver initially trended down but now increased.   - Per [place] criteria, does not meet criteria for cholangitis.   - ERCP not attempted today as the patient ate breakfast.     Recommendations:   - Will plan for potential ERCP tomorrow   - NPO after midnight.   - Can consider obtaining MRCP for now.   - CBC and CMP daily.     Discussed the case with the attending.     GI will continue to follow.     Maude Correia, PGY-5  Gastroenterology/Hepatology Fellow  Available on [employer] Teams  [phone] (Short Range Pager)  [phone] (Long Range Pager)    After 5pm, please contact the on-call GI fellow.

## 2024-06-10 NOTE — PROVIDER CONTACT NOTE (OTHER) - SITUATION
pt c/o continued chest pain from yesterday denies sob. pt has chest pain with touch/movement  bp elevated
bp elevated
hr low
pt c/o chest pain upon taking deep breath
Pt BP is 142/100,asymptamatic.

## 2024-06-10 NOTE — PROVIDER CONTACT NOTE (OTHER) - REASON
bp elevated
elevated bp + cont chest pain from yesterday
hr low
Pt BP is 142/100,asymptamatic.
pt c/o chest pain, BP elevated

## 2024-06-10 NOTE — PROVIDER CONTACT NOTE (OTHER) - BACKGROUND
s/p lap lucina 6/8
jason verdugo   back on unit from MRI
s/p lap lucina
s/p lap lucina
19F s/p lap lucina

## 2024-06-10 NOTE — PROVIDER CONTACT NOTE (OTHER) - ASSESSMENT
hr 51 asymptomatic denies chest pain
pt aox4, c/o chest pain when taking deep breath. VSS, no c/o resp distress, however /103
Pt BP is 142/100,asymptamatic.
148/97
bp 159/109

## 2024-06-10 NOTE — PROVIDER CONTACT NOTE (OTHER) - DATE AND TIME:
10-Master-2024 05:37
10-Master-2024 13:49
10-Master-2024 22:42
09-Jun-2024 14:28
10-Master-2024 17:45

## 2024-06-10 NOTE — PROGRESS NOTE ADULT - SUBJECTIVE AND OBJECTIVE BOX
ANESTHESIA POSTOP CHECK    19y Female POSTOP DAY 1 S/P laparoscopic cholecystectomy    Vital Signs Last 24 Hrs  T(C): 36.9 (10 Master 2024 09:48), Max: 36.9 (09 Jun 2024 18:00)  T(F): 98.4 (10 Master 2024 09:48), Max: 98.5 (09 Jun 2024 18:00)  HR: 60 (10 Master 2024 09:48) (60 - 75)  BP: 132/80 (10 Master 2024 09:48) (132/80 - 153/93)  BP(mean): --  RR: 18 (10 Master 2024 09:48) (17 - 19)  SpO2: 100% (10 Master 2024 09:48) (99% - 100%)    Parameters below as of 10 Master 2024 09:48  Patient On (Oxygen Delivery Method): room air      I&O's Summary    09 Jun 2024 07:01  -  10 Master 2024 07:00  --------------------------------------------------------  IN: 440 mL / OUT: 0 mL / NET: 440 mL        [x ] NO APPARENT ANESTHESIA COMPLICATIONS      Comments:

## 2024-06-10 NOTE — PROGRESS NOTE ADULT - ASSESSMENT
Ms. Holguin is a 19 year old woman who is recently post-partum (5/8/2024, induced vaginal delivery for PROM) admitted for acute cholecystitis found to have hyperbilirubinemia and MRCP concerning for possible choledocholithiasis, now seen following robot-assisted laparoscopic cholecystectomy (Dr. Valadez, 6/8/2024). Tolerating a regular diet, pain well-controlled, remains admitted with GI following for ongoing evaluation of choledocholithiasis, possible ERCP pending assessment by GI based on liver chemistries.     - Regular diet.   - Continue to monitor strict intake and output.   - Daily complete metabolic panel, liver chemistries ordered to trend fractionated bilirubirubin  - GI following, appreciate recommendations. Will obtain MRCP today    Acute Care Surgery ([id number])

## 2024-06-10 NOTE — PROVIDER CONTACT NOTE (OTHER) - ACTION/TREATMENT ORDERED:
md notified no further intervention at this time
provider notified, came to bedside to assess pt.
md notified will cont to monitor
md notified; will give hot packs + lidocaine patch
team aware, no orders for now and monitor the pt.

## 2024-06-11 ENCOUNTER — RESULT REVIEW (OUTPATIENT)
Age: 19
End: 2024-06-11

## 2024-06-11 LAB
ALBUMIN SERPL ELPH-MCNC: 3.6 G/DL — SIGNIFICANT CHANGE UP (ref 3.3–5)
ALP SERPL-CCNC: 205 U/L — HIGH (ref 40–120)
ALT FLD-CCNC: 1184 U/L — HIGH (ref 4–33)
ANION GAP SERPL CALC-SCNC: 14 MMOL/L — SIGNIFICANT CHANGE UP (ref 7–14)
APTT BLD: 32.4 SEC — SIGNIFICANT CHANGE UP (ref 24.5–35.6)
AST SERPL-CCNC: 1011 U/L — HIGH (ref 4–32)
BILIRUB DIRECT SERPL-MCNC: 3 MG/DL — HIGH (ref 0–0.3)
BILIRUB INDIRECT FLD-MCNC: 0.6 MG/DL — SIGNIFICANT CHANGE UP (ref 0–1)
BILIRUB SERPL-MCNC: 3.6 MG/DL — HIGH (ref 0.2–1.2)
BLD GP AB SCN SERPL QL: NEGATIVE — SIGNIFICANT CHANGE UP
BUN SERPL-MCNC: 2 MG/DL — LOW (ref 7–23)
CALCIUM SERPL-MCNC: 9.1 MG/DL — SIGNIFICANT CHANGE UP (ref 8.4–10.5)
CHLORIDE SERPL-SCNC: 101 MMOL/L — SIGNIFICANT CHANGE UP (ref 98–107)
CO2 SERPL-SCNC: 21 MMOL/L — LOW (ref 22–31)
CREAT SERPL-MCNC: 0.37 MG/DL — LOW (ref 0.5–1.3)
EGFR: 148 ML/MIN/1.73M2 — SIGNIFICANT CHANGE UP
GLUCOSE SERPL-MCNC: 156 MG/DL — HIGH (ref 70–99)
HCG SERPL-ACNC: <1 MIU/ML — SIGNIFICANT CHANGE UP
HCT VFR BLD CALC: 39.3 % — SIGNIFICANT CHANGE UP (ref 34.5–45)
HGB BLD-MCNC: 12.9 G/DL — SIGNIFICANT CHANGE UP (ref 11.5–15.5)
INR BLD: 1 RATIO — SIGNIFICANT CHANGE UP (ref 0.85–1.18)
MAGNESIUM SERPL-MCNC: 1.8 MG/DL — SIGNIFICANT CHANGE UP (ref 1.6–2.6)
MCHC RBC-ENTMCNC: 27.7 PG — SIGNIFICANT CHANGE UP (ref 27–34)
MCHC RBC-ENTMCNC: 32.8 GM/DL — SIGNIFICANT CHANGE UP (ref 32–36)
MCV RBC AUTO: 84.5 FL — SIGNIFICANT CHANGE UP (ref 80–100)
NRBC # BLD: 0 /100 WBCS — SIGNIFICANT CHANGE UP (ref 0–0)
NRBC # FLD: 0 K/UL — SIGNIFICANT CHANGE UP (ref 0–0)
PHOSPHATE SERPL-MCNC: 3.7 MG/DL — SIGNIFICANT CHANGE UP (ref 2.5–4.5)
PLATELET # BLD AUTO: 240 K/UL — SIGNIFICANT CHANGE UP (ref 150–400)
POTASSIUM SERPL-MCNC: 4 MMOL/L — SIGNIFICANT CHANGE UP (ref 3.5–5.3)
POTASSIUM SERPL-SCNC: 4 MMOL/L — SIGNIFICANT CHANGE UP (ref 3.5–5.3)
PROT SERPL-MCNC: 6.8 G/DL — SIGNIFICANT CHANGE UP (ref 6–8.3)
PROTHROM AB SERPL-ACNC: 11.2 SEC — SIGNIFICANT CHANGE UP (ref 9.5–13)
RBC # BLD: 4.65 M/UL — SIGNIFICANT CHANGE UP (ref 3.8–5.2)
RBC # FLD: 13 % — SIGNIFICANT CHANGE UP (ref 10.3–14.5)
RH IG SCN BLD-IMP: POSITIVE — SIGNIFICANT CHANGE UP
SODIUM SERPL-SCNC: 136 MMOL/L — SIGNIFICANT CHANGE UP (ref 135–145)
WBC # BLD: 5.32 K/UL — SIGNIFICANT CHANGE UP (ref 3.8–10.5)
WBC # FLD AUTO: 5.32 K/UL — SIGNIFICANT CHANGE UP (ref 3.8–10.5)

## 2024-06-11 PROCEDURE — 43239 EGD BIOPSY SINGLE/MULTIPLE: CPT

## 2024-06-11 PROCEDURE — 43262 ENDO CHOLANGIOPANCREATOGRAPH: CPT

## 2024-06-11 PROCEDURE — 43264 ERCP REMOVE DUCT CALCULI: CPT

## 2024-06-11 PROCEDURE — 43237 ENDOSCOPIC US EXAM ESOPH: CPT

## 2024-06-11 PROCEDURE — 74328 X-RAY BILE DUCT ENDOSCOPY: CPT | Mod: 26

## 2024-06-11 PROCEDURE — 88305 TISSUE EXAM BY PATHOLOGIST: CPT | Mod: 26

## 2024-06-11 DEVICE — GWIRE JAGTOME REVOLUTION RX 260CM/0.025IN: Type: IMPLANTABLE DEVICE | Status: FUNCTIONAL

## 2024-06-11 DEVICE — CATH BLLN EXTRACT DIST GUIDE WIRE 15MM 3LUM: Type: IMPLANTABLE DEVICE | Status: FUNCTIONAL

## 2024-06-11 RX ORDER — MAGNESIUM SULFATE 500 MG/ML
2 VIAL (ML) INJECTION ONCE
Refills: 0 | Status: COMPLETED | OUTPATIENT
Start: 2024-06-11 | End: 2024-06-11

## 2024-06-11 RX ORDER — ENOXAPARIN SODIUM 100 MG/ML
40 INJECTION SUBCUTANEOUS EVERY 24 HOURS
Refills: 0 | Status: DISCONTINUED | OUTPATIENT
Start: 2024-06-12 | End: 2024-06-12

## 2024-06-11 RX ADMIN — DEXTROSE MONOHYDRATE, SODIUM CHLORIDE, AND POTASSIUM CHLORIDE 110 MILLILITER(S): 50; .745; 4.5 INJECTION, SOLUTION INTRAVENOUS at 00:12

## 2024-06-11 RX ADMIN — Medication 1 TABLET(S): at 12:31

## 2024-06-11 RX ADMIN — LIDOCAINE 1 PATCH: 4 CREAM TOPICAL at 06:50

## 2024-06-11 RX ADMIN — Medication 25 GRAM(S): at 06:06

## 2024-06-11 NOTE — PROGRESS NOTE ADULT - ASSESSMENT
Ms. Holguin is a 19 year old woman who is recently post-partum (5/8/2024, induced vaginal delivery for PROM) admitted for acute cholecystitis found to have hyperbilirubinemia and MRCP concerning for possible choledocholithiasis, now seen following robot-assisted laparoscopic cholecystectomy (Dr. Valadez, 6/8/2024). Tolerating a regular diet, pain well-controlled, remains admitted with GI following for ongoing evaluation of choledocholithiasis, possible ERCP pending assessment by GI based on liver chemistries.       -MRCP revealed no choledocholithiasis. However, pt. T-bili is trending up with uptrend transaminitis--Will reach out to GI for plan of Care   - NPO in anticipating for possible ERCP  - Continue to monitor strict intake and output.   - Daily complete metabolic panel, liver chemistries ordered to trend fractionated bilirubirubin  - GI following, appreciate recommendations.     Acute Care Surgery ([id number])

## 2024-06-11 NOTE — CHART NOTE - NSCHARTNOTEFT_GEN_A_CORE
POST-OPERATIVE CHECK    SUBJECTIVE  Patient is s/p ERCP with removal of sludge from CBD and sphincterotomy. Pt denies abdominal pain, SOB, denies nausea, vomiting, chest pain.    Vital Signs Last 24 Hrs  T(C): 36.3 (11 Jun 2024 18:04), Max: 36.8 (11 Jun 2024 01:55)  T(F): 97.4 (11 Jun 2024 18:04), Max: 98.3 (11 Jun 2024 01:55)  HR: 68 (11 Jun 2024 18:04) (60 - 83)  BP: 130/90 (11 Jun 2024 18:04) (119/80 - 142/100)  BP(mean): --  RR: 18 (11 Jun 2024 18:04) (17 - 19)  SpO2: 99% (11 Jun 2024 18:04) (98% - 100%)    Parameters below as of 11 Jun 2024 18:04  Patient On (Oxygen Delivery Method): room air      I&O's Detail    10 Master 2024 07:01  -  11 Jun 2024 07:00  --------------------------------------------------------  IN:    dextrose 5% + sodium chloride 0.45% w/ Additives: 770 mL    Oral Fluid: 100 mL  Total IN: 870 mL    OUT:  Total OUT: 0 mL    Total NET: 870 mL      11 Jun 2024 07:01  -  11 Jun 2024 19:45  --------------------------------------------------------  IN:    dextrose 5% + sodium chloride 0.45% w/ Additives: 880 mL    Oral Fluid: 240 mL  Total IN: 1120 mL    OUT:  Total OUT: 0 mL    Total NET: 1120 mL    PAST MEDICAL & SURGICAL HISTORY:  No pertinent past medical history    No significant past surgical history    PHYSICAL EXAM  General: NAD, resting comfortably in bed  Pulmonary: Nonlabored breathing, no respiratory distress  Cardiovascular: NSR  Abdominal: soft, NT/ND  Extremities: WWP    LABS                        12.9   5.32  )-----------( 240      ( 11 Jun 2024 03:30 )             39.3     06-11    136  |  101  |  2<L>  ----------------------------<  156<H>  4.0   |  21<L>  |  0.37<L>    Ca    9.1      11 Jun 2024 03:30  Phos  3.7     06-11  Mg     1.80     06-11    TPro  6.8  /  Alb  3.6  /  TBili  3.6<H>  /  DBili  3.0<H>  /  AST  1011<H>  /  ALT  1184<H>  /  AlkPhos  205<H>  06-11    PT/INR - ( 11 Jun 2024 03:30 )   PT: 11.2 sec;   INR: 1.00 ratio         PTT - ( 11 Jun 2024 03:30 )  PTT:32.4 sec  CAPILLARY BLOOD GLUCOSE    19y F postopartum 5/8, p/w acute cholecystitis c/f choledocho s/p robotic ccy 6/8 who is now s/p ERCP with sphincterotomy and sludge removal 6/11. Recovering appropriately on the floor.    PLAN  - Diet - CLD per GI, IVF for pancreatitis ppx  - Pain control as needed  - DVT ppx  - OOB and ambulating as tolerated  - F/u AM labs

## 2024-06-11 NOTE — PROGRESS NOTE ADULT - SUBJECTIVE AND OBJECTIVE BOX
TEAM [ B ] Surgery Daily Progress Note  =====================================================    SUBJECTIVE: Patient seen and examined at bedside on AM rounds. Patient reports feeling well, pain controlled on current regimen. NPO, denies nausea, vomiting.  OOB/Amublating as tolerated. Denies fever, chills.    ALLERGIES:  No Known Allergies      --------------------------------------------------------------------------------------    MEDICATIONS:    Neurologic Medications  oxyCODONE    IR 2.5 milliGRAM(s) Oral every 4 hours PRN Moderate Pain (4 - 6)  oxyCODONE    IR 5 milliGRAM(s) Oral every 4 hours PRN Severe Pain (7 - 10)    Respiratory Medications    Cardiovascular Medications    Gastrointestinal Medications  dextrose 5% + sodium chloride 0.45% with potassium chloride 20 mEq/L 1000 milliLiter(s) IV Continuous <Continuous>  prenatal multivitamin 1 Tablet(s) Oral daily    Genitourinary Medications    Hematologic/Oncologic Medications    Antimicrobial/Immunologic Medications    Endocrine/Metabolic Medications    Topical/Other Medications  lidocaine   4% Patch 1 Patch Transdermal daily PRN Incisional pain    --------------------------------------------------------------------------------------    VITAL SIGNS:  T(C): 36.8 (06-11-24 @ 05:35), Max: 36.9 (06-10-24 @ 09:48)  HR: 60 (06-11-24 @ 05:35) (51 - 65)  BP: 142/95 (06-11-24 @ 05:35) (132/80 - 159/109)  RR: 18 (06-11-24 @ 05:35) (18 - 18)  SpO2: 100% (06-11-24 @ 05:35) (99% - 100%)  --------------------------------------------------------------------------------------    EXAM    General: NAD, resting in bed comfortably.  Cardiac: regular rate, warm and well perfused  Respiratory: Nonlabored respirations, normal cw expansion.  Abdomen: soft, + mildly TTP L side,  mild distended. incision is c/d/i.  Extremities: normal strength, FROM, no deformities    --------------------------------------------------------------------------------------    LABS                          12.9   5.32  )-----------( 240      ( 11 Jun 2024 03:30 )             39.3   06-11    136  |  101  |  2<L>  ----------------------------<  156<H>  4.0   |  21<L>  |  0.37<L>    Ca    9.1      11 Jun 2024 03:30  Phos  3.7     06-11  Mg     1.80     06-11    TPro  6.8  /  Alb  3.6  /  TBili  3.6<H>  /  DBili  3.0<H>  /  AST  1011<H>  /  ALT  1184<H>  /  AlkPhos  205<H>  06-11    --------------------------------------------------------------------------------------    INS AND OUTS:    06-10-24 @ 07:01  -  06-11-24 @ 07:00  --------------------------------------------------------  IN: 870 mL / OUT: 0 mL / NET: 870 mL      --------------------------------------------------------------------------------------

## 2024-06-12 ENCOUNTER — TRANSCRIPTION ENCOUNTER (OUTPATIENT)
Age: 19
End: 2024-06-12

## 2024-06-12 VITALS
DIASTOLIC BLOOD PRESSURE: 84 MMHG | OXYGEN SATURATION: 100 % | RESPIRATION RATE: 18 BRPM | HEART RATE: 62 BPM | SYSTOLIC BLOOD PRESSURE: 123 MMHG | TEMPERATURE: 98 F

## 2024-06-12 LAB
ALBUMIN SERPL ELPH-MCNC: 3.8 G/DL — SIGNIFICANT CHANGE UP (ref 3.3–5)
ALP SERPL-CCNC: 200 U/L — HIGH (ref 40–120)
ALT FLD-CCNC: 845 U/L — HIGH (ref 4–33)
ANION GAP SERPL CALC-SCNC: 13 MMOL/L — SIGNIFICANT CHANGE UP (ref 7–14)
AST SERPL-CCNC: 239 U/L — HIGH (ref 4–32)
BILIRUB DIRECT SERPL-MCNC: 0.4 MG/DL — HIGH (ref 0–0.3)
BILIRUB INDIRECT FLD-MCNC: 1 MG/DL — SIGNIFICANT CHANGE UP (ref 0–1)
BILIRUB SERPL-MCNC: 1.4 MG/DL — HIGH (ref 0.2–1.2)
BUN SERPL-MCNC: 5 MG/DL — LOW (ref 7–23)
CALCIUM SERPL-MCNC: 9.4 MG/DL — SIGNIFICANT CHANGE UP (ref 8.4–10.5)
CHLORIDE SERPL-SCNC: 99 MMOL/L — SIGNIFICANT CHANGE UP (ref 98–107)
CO2 SERPL-SCNC: 23 MMOL/L — SIGNIFICANT CHANGE UP (ref 22–31)
CREAT SERPL-MCNC: 0.37 MG/DL — LOW (ref 0.5–1.3)
EGFR: 148 ML/MIN/1.73M2 — SIGNIFICANT CHANGE UP
GLUCOSE SERPL-MCNC: 132 MG/DL — HIGH (ref 70–99)
HCT VFR BLD CALC: 40.3 % — SIGNIFICANT CHANGE UP (ref 34.5–45)
HGB BLD-MCNC: 13.6 G/DL — SIGNIFICANT CHANGE UP (ref 11.5–15.5)
MAGNESIUM SERPL-MCNC: 1.9 MG/DL — SIGNIFICANT CHANGE UP (ref 1.6–2.6)
MCHC RBC-ENTMCNC: 28.2 PG — SIGNIFICANT CHANGE UP (ref 27–34)
MCHC RBC-ENTMCNC: 33.7 GM/DL — SIGNIFICANT CHANGE UP (ref 32–36)
MCV RBC AUTO: 83.4 FL — SIGNIFICANT CHANGE UP (ref 80–100)
NRBC # BLD: 0 /100 WBCS — SIGNIFICANT CHANGE UP (ref 0–0)
NRBC # FLD: 0 K/UL — SIGNIFICANT CHANGE UP (ref 0–0)
PHOSPHATE SERPL-MCNC: 3.9 MG/DL — SIGNIFICANT CHANGE UP (ref 2.5–4.5)
PLATELET # BLD AUTO: 269 K/UL — SIGNIFICANT CHANGE UP (ref 150–400)
POTASSIUM SERPL-MCNC: 4.1 MMOL/L — SIGNIFICANT CHANGE UP (ref 3.5–5.3)
POTASSIUM SERPL-SCNC: 4.1 MMOL/L — SIGNIFICANT CHANGE UP (ref 3.5–5.3)
PROT SERPL-MCNC: 7.3 G/DL — SIGNIFICANT CHANGE UP (ref 6–8.3)
RBC # BLD: 4.83 M/UL — SIGNIFICANT CHANGE UP (ref 3.8–5.2)
RBC # FLD: 12.9 % — SIGNIFICANT CHANGE UP (ref 10.3–14.5)
SODIUM SERPL-SCNC: 135 MMOL/L — SIGNIFICANT CHANGE UP (ref 135–145)
WBC # BLD: 7.04 K/UL — SIGNIFICANT CHANGE UP (ref 3.8–10.5)
WBC # FLD AUTO: 7.04 K/UL — SIGNIFICANT CHANGE UP (ref 3.8–10.5)

## 2024-06-12 PROCEDURE — 99232 SBSQ HOSP IP/OBS MODERATE 35: CPT | Mod: GC

## 2024-06-12 RX ORDER — MAGNESIUM SULFATE 500 MG/ML
1 VIAL (ML) INJECTION ONCE
Refills: 0 | Status: COMPLETED | OUTPATIENT
Start: 2024-06-12 | End: 2024-06-12

## 2024-06-12 RX ADMIN — ENOXAPARIN SODIUM 40 MILLIGRAM(S): 100 INJECTION SUBCUTANEOUS at 05:12

## 2024-06-12 RX ADMIN — Medication 1 TABLET(S): at 11:39

## 2024-06-12 RX ADMIN — Medication 100 GRAM(S): at 11:39

## 2024-06-12 NOTE — PROGRESS NOTE ADULT - ATTENDING COMMENTS
The patient was seen and examined, chart and notes reviewed.  The current diagnosis, plan of care and alternatives have been discussed with the patient.  All questions have been answered and updates have been discussed.  The case was discussed with B team residents/PA's and medical students at morning B surgical rounds and throughout the course of the day.    Agree with plan  Await MRCP results  Appreciate GI input  Trend LFTs  Diet as tolerated
Agree with above  s/p ERCP with sludge removal. Minimal amount of sludge  If LFTs fail to improve; recommend liver work up    TPro  7.3  /  Alb  3.8  /  TBili  1.4  /  DBili  0.4  /  AST  239  /  ALT  845  /  AlkPhos  200  06-12 @ 05:54  TPro  6.8  /  Alb  3.6  /  TBili  3.6  /  DBili  3.0  /  AST  1011  /  ALT  1184  /  AlkPhos  205  06-11 @ 03:30
CBD stone  Post lap lucina  recommend repeat MRCP, reevaluate for retained CBD stone
Pt seen and examined with surgical housestaff.  Agree with their assessment and plan.
The patient was seen and examined, chart and notes reviewed.  The current diagnosis, plan of care and alternatives have been discussed with the patient.  All questions have been answered and updates have been discussed.  The case was discussed with B team residents/PA's and medical students at morning B surgical rounds and throughout the course of the day.    Agree with plan
The patient was seen and examined, chart and notes reviewed.  The current diagnosis, plan of care and alternatives have been discussed with the patient.  All questions have been answered and updates have been discussed.  The case was discussed with B team residents/PA's and medical students at morning B surgical rounds and throughout the course of the day.    agree with plan  MRCP reviewed  For ERCP  NPO for procedure  Trend LFts

## 2024-06-12 NOTE — DISCHARGE NOTE PROVIDER - NSDCCPTREATMENT_GEN_ALL_CORE_FT
PRINCIPAL PROCEDURE  Procedure: Robot-assisted cholecystectomy using da Saw Xi  Findings and Treatment:       SECONDARY PROCEDURE  Procedure: ERCP  Findings and Treatment:

## 2024-06-12 NOTE — DISCHARGE NOTE NURSING/CASE MANAGEMENT/SOCIAL WORK - NSDCFUADDAPPT_GEN_ALL_CORE_FT
Please follow up with your primary care physician in 1-2 weeks regarding your hospitalization, bring copies of your discharge paperwork.    Please call [phone] to schedule an appointment with Dr. Valadez for outpatient follow up within the next 1-2 weeks.

## 2024-06-12 NOTE — DISCHARGE NOTE NURSING/CASE MANAGEMENT/SOCIAL WORK - NSDCPEFALRISK_GEN_ALL_CORE
For information on Fall & Injury Prevention, visit: [email or web address] OR  [email or web address] OR  https://www.cdc.gov/steadi/patient.html

## 2024-06-12 NOTE — PROGRESS NOTE ADULT - ASSESSMENT
Impression:   19 year old female w/ recent vaginal delivery (5/8/24) p/w epigastric and RUQ abdominal pain that started 2 days prior to admission. Now s/p robotic assisted cholecystectomy. GI was consulted for choledocholithiasis.    Assessment  #Acute cholecystitis s/p lap lucina  #choledocholithiasis  - S/p robotic assisted lucina 6/8 with tender surgical sites  - Liver enzymes were downtrending prior to lap lucina; however minor elevation post-op may be anticipated. Liver initially trended down but now increased.   - Per [place] criteria, does not meet criteria for cholangitis.   - s/p ERCP on 6/11, s/p sphincterotomy, found to have biliary sludge s/p balloon extraction. No stent was placed.   - Bilirubin is downtrending 1.4 w/ decreased AST/ALT levels. Patient feels well overall.     Recommendations:   - Diet as tolerated.   - Recommended the patient to follow up with PCP as o/p to repeat CMP to make sure her liver enzymes are trending. Labs need to be completed within one week. If the liver enzymes are persistently elevated, will need to consider further evaluation of the liver. Patient agreed to it.     GI will sign off. Thank you for the consult. Please call us back if you have any questions or concerns.     All recommendations are tentative until the note is attested by an attending.     Maude Correia, PGY-5  Gastroenterology/Hepatology Fellow  Available on [employer] Teams  [phone] (Short Range Pager)  [phone] (Long Range Pager)    After 5pm, please contact the on-call GI fellow.

## 2024-06-12 NOTE — DISCHARGE NOTE PROVIDER - NSDCFUADDINST_GEN_ALL_CORE_FT
PLEASE REMAIN ON LOW FAT DIET    WOUND CARE:  Please keep incisions clean and dry. Please do not Scrub or rub incisions. Do not use lotion or powder on incisions.   BATHING: You may shower and/or sponge bathe. You may use warm soapy water in the shower and rinse, pat dry.  ACTIVITY: No heavy lifting or straining. Otherwise, you may return to your usual level of physical activity. If you are taking narcotic pain medication DO NOT drive a car, operate machinery or make important decisions.  DIET: Return to your usual diet.  NOTIFY YOUR SURGEON IF YOU HAVE: any bleeding that does not stop, any pus draining from your wound(s), any fever (over 100.4 F) persistent nausea/vomiting, or if your pain is not controlled on your discharge pain medications, unable to urinate.  Please follow up with your primary care physician in one week regarding your hospitalization, bring copies of your discharge paperwork.  Please follow up with your surgeon, Dr. Valadez   PLEASE REMAIN ON LOW FAT DIET    WOUND CARE:  Please keep incisions clean and dry. Please do not Scrub or rub incisions. Do not use lotion or powder on incisions.   BATHING: You may shower and/or sponge bathe. You may use warm soapy water in the shower and rinse, pat dry.  ACTIVITY: No heavy lifting or straining. Otherwise, you may return to your usual level of physical activity. If you are taking narcotic pain medication DO NOT drive a car, operate machinery or make important decisions.  DIET: Return to your usual diet.  NOTIFY YOUR SURGEON IF YOU HAVE: any bleeding that does not stop, any pus draining from your wound(s), any fever (over 100.4 F) persistent nausea/vomiting, or if your pain is not controlled on your discharge pain medications, unable to urinate.

## 2024-06-12 NOTE — PROGRESS NOTE ADULT - SUBJECTIVE AND OBJECTIVE BOX
Gastroenterology/Hepatology Progress Note      Interval Events:     - No acute events overnight. s/p ERCP on 6/11.   - Denied abd pain, nausea and vomiting.   Tolerating CLD well. No fevers or chills.     Allergies:  No Known Allergies      Hospital Medications:  dextrose 5% + sodium chloride 0.45% with potassium chloride 20 mEq/L 1000 milliLiter(s) IV Continuous <Continuous>  enoxaparin Injectable 40 milliGRAM(s) SubCutaneous every 24 hours  lidocaine   4% Patch 1 Patch Transdermal daily PRN  magnesium sulfate  IVPB 1 Gram(s) IV Intermittent once  oxyCODONE    IR 2.5 milliGRAM(s) Oral every 4 hours PRN  oxyCODONE    IR 5 milliGRAM(s) Oral every 4 hours PRN  prenatal multivitamin 1 Tablet(s) Oral daily      ROS: 14 point ROS negative unless otherwise state in subjective    PHYSICAL EXAM:   Vital Signs:  Vital Signs Last 24 Hrs  T(C): 36.8 (12 Jun 2024 09:42), Max: 36.9 (11 Jun 2024 22:00)  T(F): 98.2 (12 Jun 2024 09:42), Max: 98.4 (11 Jun 2024 22:00)  HR: 60 (12 Jun 2024 09:42) (60 - 83)  BP: 131/84 (12 Jun 2024 09:42) (121/70 - 133/84)  BP(mean): --  RR: 18 (12 Jun 2024 09:42) (17 - 19)  SpO2: 100% (12 Jun 2024 09:42) (97% - 100%)    Parameters below as of 12 Jun 2024 09:42  Patient On (Oxygen Delivery Method): room air      Daily     Daily     GENERAL:  No acute distress, young female, sitting on the chair.   HEENT:  NCAT, no scleral icterus  CHEST: no resp distress  HEART:  RRR  ABDOMEN:  Soft, non-tender, non-distended,   EXTREMITIES:  No LE edema  SKIN:  No rash/erythema/ecchymoses/petechiae/wounds/abscess/warm/dry  NEURO:  Alert and oriented x 3, no tremor    LABS:                        13.6   7.04  )-----------( 269      ( 12 Jun 2024 05:54 )             40.3     Mean Cell Volume: 83.4 fL (06-12-24 @ 05:54)    06-12    135  |  99  |  5<L>  ----------------------------<  132<H>  4.1   |  23  |  0.37<L>    Ca    9.4      12 Jun 2024 05:54  Phos  3.9     06-12  Mg     1.90     06-12    TPro  7.3  /  Alb  3.8  /  TBili  1.4<H>  /  DBili  0.4<H>  /  AST  239<H>  /  ALT  845<H>  /  AlkPhos  200<H>  06-12    LIVER FUNCTIONS - ( 12 Jun 2024 05:54 )  Alb: 3.8 g/dL / Pro: 7.3 g/dL / ALK PHOS: 200 U/L / ALT: 845 U/L / AST: 239 U/L / GGT: x           PT/INR - ( 11 Jun 2024 03:30 )   PT: 11.2 sec;   INR: 1.00 ratio         PTT - ( 11 Jun 2024 03:30 )  PTT:32.4 sec  Urinalysis Basic - ( 12 Jun 2024 05:54 )    Color: x / Appearance: x / SG: x / pH: x  Gluc: 132 mg/dL / Ketone: x  / Bili: x / Urobili: x   Blood: x / Protein: x / Nitrite: x   Leuk Esterase: x / RBC: x / WBC x   Sq Epi: x / Non Sq Epi: x / Bacteria: x            Imaging:    ERCP on 6/11    Findings:       The  film was normal. A standard esophagogastroduodenoscopy scope        was used for the examination of the upper gastrointestinal tract. The        scope was passed under direct vision through the upper GI tract. The        examined esophagus was normal. Scattered inflammation characterized by        erythema was found in the gastric antrum. The examined duodenum was        normal. Biopsies were taken in the stomach through the        esophagogastroduodenoscope with the cold forceps for histology. The        standard endoscope was exchanged for a linear echoendoscope, which was        advanced to the level of the ampulla. An echoendoscope was used for        examination. A small amount of hyperechoic material consistent with        sludge was visualized endosonographically in the common bile duct. The        standard endoscope was exchanged for a duodenoscope, which was advanced        to the level of the ampulla. A short 0.025 inch Jagwire was passed into        the biliary tree. The Jagtome sphincterotome was passed over the        guidewire and the bile duct was then deeply cannulated. Contrast was        injected. I personally interpreted the bile duct images. Image quality        was adequate. The lower third of the main bile duct was normal. Biliary        sphincterotomy was made with a Jagtome sphincterotome using ERBE        electrocautery. There was no post-sphincterotomy bleeding. The biliary        tree was swept with an 8.5 mm balloon and 11.5 mm balloon starting at        the upper third of the main bile duct. Sludge was swept from the duct.        Multiple additional balloon sweeps were performed using the balloon to        remove residual contrast. Indomethacin 100 mg was given via suppository        to decrease the risk of post-ERCP pancreatitis (PEP). The main        pancreatic duct was neither attempted, nor attained. At the conclusion        of the exam, no air was visualized in unusual places. The total        fluoroscopy exposure time was 1.4 minutes.                     Impression:          EGD:                       - Normal esophagus.                       - Gastritis. Biopsied.                       - Normal examined duodenum.                       EUS:                       - Hyperechoic material consistent with sludge was                        visualized endosonographically in the common bile duct.                       ERCP:                       - Normal Ampulla.                       - A biliary sphincterotomy was performed.              - The biliary tree was swept and sludge was found.

## 2024-06-12 NOTE — DISCHARGE NOTE PROVIDER - CARE PROVIDER_API CALL
Abhinav Valadez  Surgery  [id number] [place] [id number]  Phone: [phone]  Fax: [phone]  Follow Up Time: 2 weeks

## 2024-06-12 NOTE — PROGRESS NOTE ADULT - SUBJECTIVE AND OBJECTIVE BOX
B Team (General Surgery) Daily Progress Note    SUBJECTIVE:  Pt seen and examined, and is resting comfortably in bed. No acute events overnight. Pain is adequately controlled on current regimen. Pt has no complaints at this time. Tolerating diet.    OBJECTIVE:  Vital Signs Last 24 Hrs  T(C): 36.4 (12 Jun 2024 05:23), Max: 36.9 (11 Jun 2024 22:00)  T(F): 97.6 (12 Jun 2024 05:23), Max: 98.4 (11 Jun 2024 22:00)  HR: 80 (12 Jun 2024 05:23) (60 - 83)  BP: 128/86 (12 Jun 2024 05:23) (119/80 - 133/84)  BP(mean): --  RR: 18 (12 Jun 2024 05:23) (17 - 19)  SpO2: 100% (12 Jun 2024 05:23) (97% - 100%)    Parameters below as of 12 Jun 2024 05:23  Patient On (Oxygen Delivery Method): room air        I&O's Detail    11 Jun 2024 07:01  -  12 Jun 2024 07:00  --------------------------------------------------------  IN:    dextrose 5% + sodium chloride 0.45% w/ Additives: 1870 mL    Oral Fluid: 440 mL  Total IN: 2310 mL    OUT:    Voided (mL): 0 mL  Total OUT: 0 mL    Total NET: 2310 mL        Exam:  GEN: NAD  HEENT: atraumatic, normocephalic  CV: pulse present regularly  RESP: no increased work of breathing, no use of accessory muscles  GI/ABD: soft, nontender, nondistended, incisions c/d/i  EXTREMITIES: warm, pink, well-perfused                        13.6   7.04  )-----------( 269      ( 12 Jun 2024 05:54 )             40.3       06-12    135  |  99  |  5<L>  ----------------------------<  132<H>  4.1   |  23  |  0.37<L>    Ca    9.4      12 Jun 2024 05:54  Phos  3.9     06-12  Mg     1.90     06-12    TPro  7.3  /  Alb  3.8  /  TBili  1.4<H>  /  DBili  0.4<H>  /  AST  239<H>  /  ALT  845<H>  /  AlkPhos  200<H>  06-12      PT/INR - ( 11 Jun 2024 03:30 )   PT: 11.2 sec;   INR: 1.00 ratio         PTT - ( 11 Jun 2024 03:30 )  PTT:32.4 sec

## 2024-06-12 NOTE — DISCHARGE NOTE PROVIDER - NSDCFUADDAPPT_GEN_ALL_CORE_FT
Please follow up with your primary care physician in 1-2 weeks regarding your hospitalization, bring copies of your discharge paperwork.    Please call [phone] to schedule an appointment with Dr. Valadez for outpatient follow up within the next 1-2 weeks.

## 2024-06-12 NOTE — DISCHARGE NOTE PROVIDER - CARE PROVIDERS DIRECT ADDRESSES
[email or web address]

## 2024-06-12 NOTE — PROGRESS NOTE ADULT - SUBJECTIVE AND OBJECTIVE BOX
ANESTHESIA POSTOP NOTE  19y Female POSTOP DAY 1  No complaints  Vital Signs Last 24 Hrs  T(C): 36.8 (12 Jun 2024 09:42), Max: 36.9 (11 Jun 2024 22:00)  T(F): 98.2 (12 Jun 2024 09:42), Max: 98.4 (11 Jun 2024 22:00)  HR: 60 (12 Jun 2024 09:42) (60 - 83)  BP: 131/84 (12 Jun 2024 09:42) (119/80 - 133/84)  BP(mean): --  RR: 18 (12 Jun 2024 09:42) (17 - 19)  SpO2: 100% (12 Jun 2024 09:42) (97% - 100%)    Parameters below as of 12 Jun 2024 09:42  Patient On (Oxygen Delivery Method): room air      I&O's Summary    11 Jun 2024 07:01  -  12 Jun 2024 07:00  --------------------------------------------------------  IN: 2310 mL / OUT: 0 mL / NET: 2310 mL        [ X ] NO APPARENT ANESTHESIA COMPLICATIONS      Comments:

## 2024-06-12 NOTE — DISCHARGE NOTE PROVIDER - NSDCCAREPROVSEEN_GEN_ALL_CORE_FT
On 4/1 we attempted to call Nicole Lynch to schedule an appointment with the [facility]. A detailed message was left for the patient to contact the [facility] at [phone].   Abhinav Valadez

## 2024-06-12 NOTE — DISCHARGE NOTE PROVIDER - HOSPITAL COURSE
19 year old female w/ recent vaginal delivery (5/8/24) p/w epigastric and RUQ abdominal pain that started 2 days PTA. Pt. came into ED on June 6, 2024 Patient reports the pain was initially 2 days PTA but resolved but returned day before with repeated episodes of nausea/vomiting. Patient reports pain is pressure in her right upper abdomen and radiates to back. Reports inability to tolerate PO and SOB. Denies fever/chills, dysuria. diarrhea, history of similar pain.  US done found Cholelithiasis and gallbladder wall thickening, concerning for acute   cholecystitis.  Pt was admitted for management had MRCP + with CBD .8cm  Pt. went to OR on June 8, 2024  s/p robotic lap cholecystectomy gallbladder notably edematous and inflamed. Surgery was uncomplicated. However, Post op patient with uptrending transaminitis and T. bili.   Repeat MRCP was negative, however, LFTs continue to rise. Pt. s/p ERCP by GI on June 11, 2024 which also uncomplicated.   Pt. thereafter tolerating diet, AVSS. Labs were improving.   Rest of in hospital care was uncomplicated. Pt. discharge in stable condition.     pending                 19 year old female w/ recent vaginal delivery (5/8/24) p/w epigastric and RUQ abdominal pain that started 2 days PTA. Pt. came into ED on June 6, 2024 Patient reports the pain was initially 2 days PTA but resolved but returned day before with repeated episodes of nausea/vomiting. Patient reports pain is pressure in her right upper abdomen and radiates to back. Reports inability to tolerate PO and SOB. Denies fever/chills, dysuria. diarrhea, history of similar pain.  US done found Cholelithiasis and gallbladder wall thickening, concerning for acute   cholecystitis.  Pt was admitted for management had MRCP + with CBD .8cm  Pt. went to OR on June 8, 2024  s/p robotic lap cholecystectomy gallbladder notably edematous and inflamed. Surgery was uncomplicated. However, Post op patient with uptrending transaminitis and T. bili.   Repeat MRCP was negative, however, LFTs continue to rise. Pt. s/p ERCP by GI on June 11, 2024 which also uncomplicated.   Pt. thereafter tolerating diet, AVSS. Labs were improving.   Rest of in hospital care was uncomplicated. Pt. discharge in stable condition.

## 2024-06-12 NOTE — DISCHARGE NOTE NURSING/CASE MANAGEMENT/SOCIAL WORK - PATIENT PORTAL LINK FT
You can access the FollowMyHealth Patient Portal offered by [facility] by registering at the following website: [email or web address]. By joining [employer]’s FollowMyHealth portal, you will also be able to view your health information using other applications (apps) compatible with our system.

## 2024-06-12 NOTE — DISCHARGE NOTE PROVIDER - NSDCCPCAREPLAN_GEN_ALL_CORE_FT
PRINCIPAL DISCHARGE DIAGNOSIS  Diagnosis: Choledocholithiasis  Assessment and Plan of Treatment:       SECONDARY DISCHARGE DIAGNOSES  Diagnosis: Transaminitis  Assessment and Plan of Treatment:

## 2024-06-12 NOTE — PROGRESS NOTE ADULT - PROVIDER SPECIALTY LIST ADULT
Anesthesia
Surgery
Anesthesia
Gastroenterology
Surgery
Gastroenterology
Surgery

## 2024-06-12 NOTE — PROGRESS NOTE ADULT - ASSESSMENT
19 year old woman who is recently post-partum (5/8/2024, induced vaginal delivery for PROM) admitted for acute cholecystitis found to have hyperbilirubinemia and MRCP concerning for possible choledocholithiasis, now seen following robot-assisted laparoscopic cholecystectomy (Dr. Valadez, 6/8/2024). Tolerating a regular diet, pain well-controlled, remains admitted with GI following for ongoing evaluation of choledocholithiasis. S/p ERCP w/ sphincterotomy; no stent or stones (6/11).    Plan:  -MRCP revealed no choledocholithiasis  - CLD, advancing diet today  - Continue to monitor strict intake and output.   - Daily complete metabolic panel, liver chemistries ordered to trend fractionated bilirubirubin  - GI following, appreciate recommendations  - OOB/AAT  - Plan to discharge later today if tolerating diet    B Team Surgery,  [id number]

## 2024-06-18 LAB
SURGICAL PATHOLOGY STUDY: SIGNIFICANT CHANGE UP
SURGICAL PATHOLOGY STUDY: SIGNIFICANT CHANGE UP

## 2024-07-12 ENCOUNTER — APPOINTMENT (OUTPATIENT)
Dept: TRAUMA SURGERY | Facility: HOSPITAL | Age: 19
End: 2024-07-12

## 2024-07-12 VITALS
DIASTOLIC BLOOD PRESSURE: 88 MMHG | HEIGHT: 63 IN | BODY MASS INDEX: 28.35 KG/M2 | HEART RATE: 83 BPM | SYSTOLIC BLOOD PRESSURE: 138 MMHG | WEIGHT: 160 LBS | TEMPERATURE: 97.9 F

## (undated) DEVICE — DRSG CURITY GAUZE SPONGE 4 X 4" 12-PLY NON-STERILE

## (undated) DEVICE — SYR LUER LOK 20CC

## (undated) DEVICE — SUT MONOCRYL 4-0 27" PS-2 UNDYED

## (undated) DEVICE — TUBING STRYKEFLOW II SUCTION / IRRIGATOR

## (undated) DEVICE — D HELP - CLEARVIEW CLEARIFY SYSTEM

## (undated) DEVICE — GOWN LG

## (undated) DEVICE — ELCTR ECG CONDUCTIVE ADHESIVE

## (undated) DEVICE — UNDERPAD LINEN SAVER 17 X 24"

## (undated) DEVICE — TROCAR COVIDIEN VERSAPORT BLADELESS OPTICAL 5MM STANDARD

## (undated) DEVICE — XI DRAPE COLUMN

## (undated) DEVICE — BITE BLOCK ADULT 20 X 27MM (GREEN)

## (undated) DEVICE — TROCAR COVIDIEN BLUNT TIP HASSAN 10MM

## (undated) DEVICE — XI SEAL UNIVERSIAL 5-12MM

## (undated) DEVICE — DISSECTOR ENDOSCOPIC KITTNER SINGLE TIP

## (undated) DEVICE — SYR LUER LOK 3CC

## (undated) DEVICE — DRAPE 3/4 SHEET 52X76"

## (undated) DEVICE — SOL INJ NS 0.9% 500ML 1-PORT

## (undated) DEVICE — PACK GENERAL LAPAROSCOPY

## (undated) DEVICE — ENDOCATCH 10MM SPECIMEN POUCH

## (undated) DEVICE — OLYMPUS DISTAL COVER ENDOSCOPE

## (undated) DEVICE — BALLOON US ENDO

## (undated) DEVICE — SALIVA EJECTOR (BLUE)

## (undated) DEVICE — SOL IRR POUR NS 0.9% 1000ML

## (undated) DEVICE — XI TIP COVER

## (undated) DEVICE — BASIN SET SINGLE

## (undated) DEVICE — PACK IV START WITH CHG

## (undated) DEVICE — ELCTR BOVIE PENCIL SMOKE EVACUATION

## (undated) DEVICE — NDL HYPO SAFE 22G X 1" (BLACK)

## (undated) DEVICE — DVC AUTO CAP RX LOKG

## (undated) DEVICE — INJ SYS RAP REFIL

## (undated) DEVICE — VENODYNE/SCD SLEEVE CALF MEDIUM

## (undated) DEVICE — WARMING BLANKET UPPER ADULT

## (undated) DEVICE — DRAPE TOWEL BLUE 17" X 24"

## (undated) DEVICE — TIP METZENBAUM SCISSOR MONOPOLAR ENDOCUT (ORANGE)

## (undated) DEVICE — ELCTR BOVIE TIP BLADE INSULATED 2.75" EDGE

## (undated) DEVICE — XI ENDOWRIST SUCTION IRRIGATOR 8MM

## (undated) DEVICE — SOL IRR POUR NS 0.9% 500ML

## (undated) DEVICE — XI DRAPE ARM

## (undated) DEVICE — DRAPE 1/2 SHEET 40X57"

## (undated) DEVICE — POSITIONER STRAP ARMBOARD VELCRO TS-30

## (undated) DEVICE — CATH IV SAFE BC 22G X 1" (BLUE)

## (undated) DEVICE — BLADE SURGICAL #15 CARBON

## (undated) DEVICE — SOL IRR POUR H2O 500ML

## (undated) DEVICE — TUBING OLYMPUS INSUFFLATION

## (undated) DEVICE — TUBING SUCTION NONCONDUCTIVE 6MM X 12FT

## (undated) DEVICE — TROCAR COVIDIEN VERSAONE BLADED FIXATION 11MM STANDARD

## (undated) DEVICE — PROTECTOR HEEL / ELBOW

## (undated) DEVICE — OMNIPAQUE 300  30ML

## (undated) DEVICE — SYR LUER LOK 10CC

## (undated) DEVICE — POSITIONER PINK PAD PIGAZZI SYSTEM W ARM PROTECTOR

## (undated) DEVICE — SUT VICRYL 0 27" UR-6

## (undated) DEVICE — DRSG BANDAID 0.75X3"

## (undated) DEVICE — ELCTR GROUNDING PAD ADULT COVIDIEN

## (undated) DEVICE — GLV 7.5 PROTEXIS (WHITE)

## (undated) DEVICE — BASIN EMESIS 10IN GRADUATED MAUVE

## (undated) DEVICE — XI OBTURATOR OPTICAL BLADELESS 8MM

## (undated) DEVICE — DRSG STERISTRIPS 0.5 X 4"

## (undated) DEVICE — FOLEY TRAY 16FR 5CC LF UMETER CLOSED

## (undated) DEVICE — TUBING INSUFFLATION LAP FILTER 10FT

## (undated) DEVICE — DENTURE CUP PINK

## (undated) DEVICE — DRSG 2X2

## (undated) DEVICE — DRSG DERMABOND 0.7ML